# Patient Record
Sex: MALE | Race: OTHER | NOT HISPANIC OR LATINO | URBAN - METROPOLITAN AREA
[De-identification: names, ages, dates, MRNs, and addresses within clinical notes are randomized per-mention and may not be internally consistent; named-entity substitution may affect disease eponyms.]

---

## 2017-05-22 ENCOUNTER — EMERGENCY (EMERGENCY)
Facility: HOSPITAL | Age: 67
LOS: 1 days | Discharge: HOME | End: 2017-05-22
Attending: EMERGENCY MEDICINE | Admitting: EMERGENCY MEDICINE
Payer: COMMERCIAL

## 2017-05-22 VITALS
OXYGEN SATURATION: 100 % | TEMPERATURE: 98 F | HEIGHT: 65 IN | HEART RATE: 98 BPM | RESPIRATION RATE: 14 BRPM | SYSTOLIC BLOOD PRESSURE: 142 MMHG | DIASTOLIC BLOOD PRESSURE: 74 MMHG | WEIGHT: 154.98 LBS

## 2017-05-22 VITALS
OXYGEN SATURATION: 98 % | SYSTOLIC BLOOD PRESSURE: 114 MMHG | DIASTOLIC BLOOD PRESSURE: 77 MMHG | TEMPERATURE: 98 F | RESPIRATION RATE: 15 BRPM | HEART RATE: 94 BPM

## 2017-05-22 DIAGNOSIS — I10 ESSENTIAL (PRIMARY) HYPERTENSION: ICD-10-CM

## 2017-05-22 DIAGNOSIS — E11.9 TYPE 2 DIABETES MELLITUS WITHOUT COMPLICATIONS: ICD-10-CM

## 2017-05-22 DIAGNOSIS — R21 RASH AND OTHER NONSPECIFIC SKIN ERUPTION: ICD-10-CM

## 2017-05-22 DIAGNOSIS — L51.9 ERYTHEMA MULTIFORME, UNSPECIFIED: ICD-10-CM

## 2017-05-22 LAB
ALBUMIN SERPL ELPH-MCNC: 3.2 G/DL — LOW (ref 3.3–5)
ALP SERPL-CCNC: 71 U/L — SIGNIFICANT CHANGE UP (ref 40–120)
ALT FLD-CCNC: 22 U/L — SIGNIFICANT CHANGE UP (ref 12–78)
AMYLASE P1 CFR SERPL: 52 U/L — SIGNIFICANT CHANGE UP (ref 25–115)
ANION GAP SERPL CALC-SCNC: 12 MMOL/L — SIGNIFICANT CHANGE UP (ref 5–17)
APPEARANCE UR: CLEAR — SIGNIFICANT CHANGE UP
APTT BLD: 24.8 SEC — LOW (ref 27.5–37.4)
AST SERPL-CCNC: 20 U/L — SIGNIFICANT CHANGE UP (ref 15–37)
BILIRUB SERPL-MCNC: 0.4 MG/DL — SIGNIFICANT CHANGE UP (ref 0.2–1.2)
BILIRUB UR-MCNC: NEGATIVE — SIGNIFICANT CHANGE UP
BUN SERPL-MCNC: 20 MG/DL — SIGNIFICANT CHANGE UP (ref 7–23)
CALCIUM SERPL-MCNC: 8.6 MG/DL — SIGNIFICANT CHANGE UP (ref 8.5–10.1)
CHLORIDE SERPL-SCNC: 103 MMOL/L — SIGNIFICANT CHANGE UP (ref 96–108)
CO2 SERPL-SCNC: 22 MMOL/L — SIGNIFICANT CHANGE UP (ref 22–31)
COLOR SPEC: YELLOW — SIGNIFICANT CHANGE UP
CREAT SERPL-MCNC: 1.2 MG/DL — SIGNIFICANT CHANGE UP (ref 0.5–1.3)
DIFF PNL FLD: ABNORMAL
EOSINOPHIL NFR BLD AUTO: 4 % — SIGNIFICANT CHANGE UP (ref 0–6)
GLUCOSE SERPL-MCNC: 218 MG/DL — HIGH (ref 70–99)
GLUCOSE UR QL: 300 MG/DL
HCT VFR BLD CALC: 28.9 % — LOW (ref 39–50)
HGB BLD-MCNC: 9.5 G/DL — LOW (ref 13–17)
INR BLD: 1.13 RATIO — SIGNIFICANT CHANGE UP (ref 0.88–1.16)
KETONES UR-MCNC: NEGATIVE — SIGNIFICANT CHANGE UP
LEUKOCYTE ESTERASE UR-ACNC: NEGATIVE — SIGNIFICANT CHANGE UP
LIDOCAIN IGE QN: 172 U/L — SIGNIFICANT CHANGE UP (ref 73–393)
LYMPHOCYTES # BLD AUTO: 35 % — SIGNIFICANT CHANGE UP (ref 13–44)
MCHC RBC-ENTMCNC: 30.8 PG — SIGNIFICANT CHANGE UP (ref 27–34)
MCHC RBC-ENTMCNC: 33 GM/DL — SIGNIFICANT CHANGE UP (ref 32–36)
MCV RBC AUTO: 93.3 FL — SIGNIFICANT CHANGE UP (ref 80–100)
MONOCYTES NFR BLD AUTO: 6 % — SIGNIFICANT CHANGE UP (ref 1–9)
NEUTROPHILS NFR BLD AUTO: 54 % — SIGNIFICANT CHANGE UP (ref 43–77)
NITRITE UR-MCNC: NEGATIVE — SIGNIFICANT CHANGE UP
PH UR: 5 — SIGNIFICANT CHANGE UP (ref 5–8)
PLATELET # BLD AUTO: 109 K/UL — LOW (ref 150–400)
POTASSIUM SERPL-MCNC: 4.2 MMOL/L — SIGNIFICANT CHANGE UP (ref 3.5–5.3)
POTASSIUM SERPL-SCNC: 4.2 MMOL/L — SIGNIFICANT CHANGE UP (ref 3.5–5.3)
PROT SERPL-MCNC: 7.3 G/DL — SIGNIFICANT CHANGE UP (ref 6–8.3)
PROT UR-MCNC: 25 MG/DL
PROTHROM AB SERPL-ACNC: 12.4 SEC — SIGNIFICANT CHANGE UP (ref 9.8–12.7)
RBC # BLD: 3.1 M/UL — LOW (ref 4.2–5.8)
RBC # FLD: 13.6 % — SIGNIFICANT CHANGE UP (ref 10.3–14.5)
SODIUM SERPL-SCNC: 137 MMOL/L — SIGNIFICANT CHANGE UP (ref 135–145)
SP GR SPEC: 1.02 — SIGNIFICANT CHANGE UP (ref 1.01–1.02)
UROBILINOGEN FLD QL: NEGATIVE — SIGNIFICANT CHANGE UP
WBC # BLD: 3.8 K/UL — SIGNIFICANT CHANGE UP (ref 3.8–10.5)
WBC # FLD AUTO: 3.8 K/UL — SIGNIFICANT CHANGE UP (ref 3.8–10.5)

## 2017-05-22 PROCEDURE — 99284 EMERGENCY DEPT VISIT MOD MDM: CPT | Mod: 25

## 2017-05-22 PROCEDURE — 96375 TX/PRO/DX INJ NEW DRUG ADDON: CPT

## 2017-05-22 PROCEDURE — 99284 EMERGENCY DEPT VISIT MOD MDM: CPT

## 2017-05-22 PROCEDURE — 85027 COMPLETE CBC AUTOMATED: CPT

## 2017-05-22 PROCEDURE — 85730 THROMBOPLASTIN TIME PARTIAL: CPT

## 2017-05-22 PROCEDURE — 83690 ASSAY OF LIPASE: CPT

## 2017-05-22 PROCEDURE — 85610 PROTHROMBIN TIME: CPT

## 2017-05-22 PROCEDURE — 80053 COMPREHEN METABOLIC PANEL: CPT

## 2017-05-22 PROCEDURE — 87086 URINE CULTURE/COLONY COUNT: CPT

## 2017-05-22 PROCEDURE — 96374 THER/PROPH/DIAG INJ IV PUSH: CPT

## 2017-05-22 PROCEDURE — 82150 ASSAY OF AMYLASE: CPT

## 2017-05-22 PROCEDURE — 81001 URINALYSIS AUTO W/SCOPE: CPT

## 2017-05-22 RX ORDER — SODIUM CHLORIDE 9 MG/ML
3 INJECTION INTRAMUSCULAR; INTRAVENOUS; SUBCUTANEOUS ONCE
Qty: 0 | Refills: 0 | Status: COMPLETED | OUTPATIENT
Start: 2017-05-22 | End: 2017-05-22

## 2017-05-22 RX ORDER — SODIUM CHLORIDE 9 MG/ML
1000 INJECTION INTRAMUSCULAR; INTRAVENOUS; SUBCUTANEOUS ONCE
Qty: 0 | Refills: 0 | Status: COMPLETED | OUTPATIENT
Start: 2017-05-22 | End: 2017-05-22

## 2017-05-22 RX ORDER — FAMOTIDINE 10 MG/ML
20 INJECTION INTRAVENOUS ONCE
Qty: 0 | Refills: 0 | Status: COMPLETED | OUTPATIENT
Start: 2017-05-22 | End: 2017-05-22

## 2017-05-22 RX ADMIN — Medication 125 MILLIGRAM(S): at 13:42

## 2017-05-22 RX ADMIN — SODIUM CHLORIDE 1000 MILLILITER(S): 9 INJECTION INTRAMUSCULAR; INTRAVENOUS; SUBCUTANEOUS at 18:16

## 2017-05-22 RX ADMIN — SODIUM CHLORIDE 1000 MILLILITER(S): 9 INJECTION INTRAMUSCULAR; INTRAVENOUS; SUBCUTANEOUS at 13:44

## 2017-05-22 RX ADMIN — FAMOTIDINE 20 MILLIGRAM(S): 10 INJECTION INTRAVENOUS at 13:42

## 2017-05-22 RX ADMIN — SODIUM CHLORIDE 3 MILLILITER(S): 9 INJECTION INTRAMUSCULAR; INTRAVENOUS; SUBCUTANEOUS at 13:44

## 2017-05-22 NOTE — ED PROVIDER NOTE - PLAN OF CARE
Return to the ED for any new or worsening symptoms  Take your medication as prescribed  Continue the Prednisone   Follow up with your PMD in 2-3 days for a recheck   Follow up with Dermatology as an outpatient

## 2017-05-22 NOTE — ED PROVIDER NOTE - CHPI ED SYMPTOMS NEG
no fever/no bruising/no pain/no inflammation/no vomiting/no petechia/no scaly patches on skin/no chills/no decreased eating/drinking

## 2017-05-22 NOTE — ED PROVIDER NOTE - CARE PLAN
Principal Discharge DX:	Erythema multiforme  Instructions for follow-up, activity and diet:	Return to the ED for any new or worsening symptoms  Take your medication as prescribed  Continue the Prednisone   Follow up with your PMD in 2-3 days for a recheck   Follow up with Dermatology as an outpatient  Secondary Diagnosis:	Rash

## 2017-05-22 NOTE — ED PROVIDER NOTE - OBJECTIVE STATEMENT
Pt is a 66 yo male who presents to the ED with a cc of possible allergic reaction.  Pt reports that he was taking Clindamycin and recently stopped the prescription.  He had been on this medication for cough and nasal congestion with has since resolved. Yesterday morning he started to experience generalized itching worse in his lower ext and he noted that he developed a generalized rash.  he called his PMD and was started on Prednisone with no relief and so he came to the ED for further elv.  Denies fever, chills, N/V/D/C, CP, SOB, abd pain, ext numbness or weakness.  Pt denies tongue swelling or difficulty swallowing.  Denies prior previous reactions.

## 2017-05-22 NOTE — ED ADULT NURSE NOTE - OBJECTIVE STATEMENT
pt comes in with itchy rash, all over legs arms and body, back is excoriated. pt denies fever. pt was on clindamycin recently.

## 2017-05-23 LAB
CULTURE RESULTS: NO GROWTH — SIGNIFICANT CHANGE UP
SPECIMEN SOURCE: SIGNIFICANT CHANGE UP

## 2018-01-10 ENCOUNTER — EMERGENCY (EMERGENCY)
Facility: HOSPITAL | Age: 68
LOS: 1 days | Discharge: HOME | End: 2018-01-10
Attending: EMERGENCY MEDICINE
Payer: COMMERCIAL

## 2018-01-10 VITALS
RESPIRATION RATE: 20 BRPM | DIASTOLIC BLOOD PRESSURE: 92 MMHG | HEART RATE: 100 BPM | SYSTOLIC BLOOD PRESSURE: 178 MMHG | WEIGHT: 154.98 LBS | OXYGEN SATURATION: 98 % | TEMPERATURE: 99 F | HEIGHT: 65 IN

## 2018-01-10 PROCEDURE — 99283 EMERGENCY DEPT VISIT LOW MDM: CPT

## 2018-01-10 PROCEDURE — 99284 EMERGENCY DEPT VISIT MOD MDM: CPT

## 2018-01-10 RX ORDER — AZTREONAM 2 G
1 VIAL (EA) INJECTION
Qty: 14 | Refills: 0 | OUTPATIENT
Start: 2018-01-10 | End: 2018-01-16

## 2018-01-10 NOTE — ED PROVIDER NOTE - OBJECTIVE STATEMENT
68 y/o M pt w/ PMHx of DM, HLD, HTN presents to the ED c/o swelling under. Pt also c/o of 3 days of cough. Denies fever, chills, ear pain or any other complaints. NKDA.

## 2018-01-10 NOTE — ED ADULT TRIAGE NOTE - CHIEF COMPLAINT QUOTE
C/o swelling to left eyelid x 2 days but worsened since last night with pain on left side of face, unable to wear denture as hurts on left side

## 2019-08-13 NOTE — ED ADULT NURSE NOTE - CHIEF COMPLAINT QUOTE
S/P allergic reaction to antibiotic. Dosen"t remember the antibiotic. C/O generalized hives to body Detail Level: Generalized Detail Level: Zone Detail Level: Simple

## 2019-09-19 PROBLEM — B02.9 ZOSTER WITHOUT COMPLICATIONS: Chronic | Status: ACTIVE | Noted: 2018-01-10

## 2019-10-17 ENCOUNTER — APPOINTMENT (OUTPATIENT)
Dept: VASCULAR SURGERY | Facility: CLINIC | Age: 69
End: 2019-10-17
Payer: COMMERCIAL

## 2019-10-17 VITALS
BODY MASS INDEX: 26.33 KG/M2 | SYSTOLIC BLOOD PRESSURE: 155 MMHG | DIASTOLIC BLOOD PRESSURE: 77 MMHG | TEMPERATURE: 98 F | HEART RATE: 83 BPM | WEIGHT: 158 LBS | HEIGHT: 65 IN

## 2019-10-17 PROCEDURE — 93924 LWR XTR VASC STDY BILAT: CPT

## 2019-10-17 PROCEDURE — 99204 OFFICE O/P NEW MOD 45 MIN: CPT

## 2019-10-17 PROCEDURE — 93880 EXTRACRANIAL BILAT STUDY: CPT

## 2019-10-17 NOTE — ASSESSMENT
[FreeTextEntry1] : 68 yo male with history of dm, htn, hld, cad s/p pci, carotid stenosis presents for evaluation of lower extremity claudication\par carotid duplex shows 50-60% right ica stenosis and 60-79% left ica stenosis \par ashley/pvr shows mild  bilateral disease with 0.9 at rest and drops to 0.7 after exercise \par at this time would not recommend any surgical or endovascular intervention for his lower extremity claudications \par pt advised to increase exercise and continue with medical management  \par pt to follow up in 6 months with repeat duplex

## 2019-10-17 NOTE — PHYSICAL EXAM
[2+] : right 2+ [No Rash or Lesion] : No rash or lesion [Alert] : alert [Calm] : calm [JVD] : no jugular venous distention  [Normal Breath Sounds] : Normal breath sounds [0] : left 0 [Ankle Swelling (On Exam)] : not present [Varicose Veins Of Lower Extremities] : not present [Abdomen Tenderness] : ~T ~M No abdominal tenderness [] : not present [Skin Ulcer] : no ulcer [de-identified] : motor intact b/l lower extremities, muscle strength 5/5 with dorsi and plantar flexion\par  [de-identified] : appears well  [de-identified] : cn 2-12 intact, sensation intact b/l upper and lower extremities

## 2019-10-17 NOTE — CONSULT LETTER
[Dear  ___] : Dear  [unfilled], [Consult Letter:] : I had the pleasure of evaluating your patient, [unfilled]. [Please see my note below.] : Please see my note below. [Sincerely,] : Sincerely, [FreeTextEntry3] : Anthony Carey M.D., FJENNIFER., R.P.V.I.\par \par  [facility]\par  of  Surgery\par Assistant Professor of Radiology\par Vascular Surgery at [facility]\par

## 2019-10-17 NOTE — HISTORY OF PRESENT ILLNESS
[FreeTextEntry1] : 68 yo male with history of dm, htn, hld, cad s/p pci, carotid stenosis presents for evaluation.  pt denies any history of cva or stroke like symptoms.  pt also reports lower extremity claudications after walking about 3-4 blocks.  pt denies any history of rest pain or lower extremity ulcerations.  \par pt states that he was evaluated by cardiology and underwent an angiogram, no intervention was done due to "calcifications".  pt states symptoms are bilateral but slightly worse on the left lower extremity

## 2020-07-16 ENCOUNTER — APPOINTMENT (OUTPATIENT)
Dept: VASCULAR SURGERY | Facility: CLINIC | Age: 70
End: 2020-07-16
Payer: COMMERCIAL

## 2020-07-16 VITALS
BODY MASS INDEX: 26.82 KG/M2 | HEIGHT: 65 IN | HEART RATE: 80 BPM | DIASTOLIC BLOOD PRESSURE: 79 MMHG | WEIGHT: 161 LBS | SYSTOLIC BLOOD PRESSURE: 140 MMHG

## 2020-07-16 VITALS — TEMPERATURE: 98 F

## 2020-07-16 PROCEDURE — 99213 OFFICE O/P EST LOW 20 MIN: CPT

## 2020-07-16 PROCEDURE — 93880 EXTRACRANIAL BILAT STUDY: CPT

## 2020-07-16 NOTE — HISTORY OF PRESENT ILLNESS
[FreeTextEntry1] : 69 yo male with history of dm, htn, hld, cad s/p pci, carotid stenosis presents for evaluation.  pt denies any history of cva or stroke like symptoms.  pt also reports lower extremity claudications after walking about 3-4 blocks.  pt denies any history of rest pain or lower extremity ulcerations.  \par pt states that he was evaluated by cardiology and underwent an angiogram, no intervention was done due to "calcifications".  pt states symptoms are bilateral but slightly worse on the left lower extremity

## 2020-07-16 NOTE — PHYSICAL EXAM
[JVD] : no jugular venous distention  [Normal Breath Sounds] : Normal breath sounds [2+] : left 2+ [Ankle Swelling (On Exam)] : not present [0] : left 0 [Varicose Veins Of Lower Extremities] : not present [Abdomen Tenderness] : ~T ~M No abdominal tenderness [No Rash or Lesion] : No rash or lesion [] : not present [Skin Ulcer] : no ulcer [Alert] : alert [Calm] : calm [de-identified] : appears well  [de-identified] : motor intact b/l lower extremities, muscle strength 5/5 with dorsi and plantar flexion\par  [de-identified] : cn 2-12 intact, sensation intact b/l upper and lower extremities

## 2020-07-16 NOTE — ASSESSMENT
[FreeTextEntry1] : 71 yo male with history of dm, htn, hld, cad s/p pci, carotid stenosis \par carotid duplex shows 50-60% right ica stenosis and 60-79% left ica stenosis \par \par at this time would not recommend any surgical or endovascular intervention for his lower extremity claudications \par pt advised to increase exercise and continue with medical management  \par pt to follow up in 6 months with repeat duplex

## 2020-08-20 NOTE — ED PROVIDER NOTE - EYES, MLM
-- DO NOT REPLY / DO NOT REPLY ALL --  -- Message is from the [facility]--    COVID-19 Universal Screening: N/A - Not about scheduling    General Patient Message      Reason for Call: The nurse  wanted to know if its okay to use triple antibiotic ointment for the patient skin tear.     Caller Information       Type Contact Phone    08/20/2020 05:22 PM CDT Phone (Incoming) Enrike (Nurse) [phone]          Alternative phone number: n/a    Turnaround time given to caller:   \"This message will be sent to [state Provider's name]. The clinical team will fulfill your request as soon as they review your message when the office opens tomorrow.\"     Clear bilaterally, pupils equal, round and reactive to light.

## 2020-11-05 VITALS
OXYGEN SATURATION: 99 % | DIASTOLIC BLOOD PRESSURE: 59 MMHG | SYSTOLIC BLOOD PRESSURE: 124 MMHG | HEART RATE: 116 BPM | TEMPERATURE: 98 F | WEIGHT: 156.09 LBS | RESPIRATION RATE: 18 BRPM | HEIGHT: 65 IN

## 2020-11-06 ENCOUNTER — INPATIENT (INPATIENT)
Facility: HOSPITAL | Age: 70
LOS: 1 days | Discharge: HOME | DRG: 311 | End: 2020-11-08
Attending: INTERNAL MEDICINE | Admitting: INTERNAL MEDICINE
Payer: COMMERCIAL

## 2020-11-06 DIAGNOSIS — N18.9 CHRONIC KIDNEY DISEASE, UNSPECIFIED: ICD-10-CM

## 2020-11-06 DIAGNOSIS — I73.9 PERIPHERAL VASCULAR DISEASE, UNSPECIFIED: ICD-10-CM

## 2020-11-06 DIAGNOSIS — I24.8 OTHER FORMS OF ACUTE ISCHEMIC HEART DISEASE: ICD-10-CM

## 2020-11-06 DIAGNOSIS — D64.9 ANEMIA, UNSPECIFIED: ICD-10-CM

## 2020-11-06 DIAGNOSIS — E11.9 TYPE 2 DIABETES MELLITUS WITHOUT COMPLICATIONS: ICD-10-CM

## 2020-11-06 DIAGNOSIS — I10 ESSENTIAL (PRIMARY) HYPERTENSION: ICD-10-CM

## 2020-11-06 DIAGNOSIS — Z95.5 PRESENCE OF CORONARY ANGIOPLASTY IMPLANT AND GRAFT: Chronic | ICD-10-CM

## 2020-11-06 DIAGNOSIS — I25.10 ATHEROSCLEROTIC HEART DISEASE OF NATIVE CORONARY ARTERY WITHOUT ANGINA PECTORIS: ICD-10-CM

## 2020-11-06 LAB
ALBUMIN SERPL ELPH-MCNC: 3.7 G/DL — SIGNIFICANT CHANGE UP (ref 3.3–5)
ALBUMIN SERPL ELPH-MCNC: 3.8 G/DL — SIGNIFICANT CHANGE UP (ref 3.3–5)
ALP SERPL-CCNC: 61 U/L — SIGNIFICANT CHANGE UP (ref 40–120)
ALP SERPL-CCNC: 66 U/L — SIGNIFICANT CHANGE UP (ref 40–120)
ALT FLD-CCNC: 7 U/L — LOW (ref 10–45)
ALT FLD-CCNC: 8 U/L — LOW (ref 10–45)
ANION GAP SERPL CALC-SCNC: 14 MMOL/L — SIGNIFICANT CHANGE UP (ref 5–17)
ANION GAP SERPL CALC-SCNC: 14 MMOL/L — SIGNIFICANT CHANGE UP (ref 5–17)
ANISOCYTOSIS BLD QL: SIGNIFICANT CHANGE UP
ANISOCYTOSIS BLD QL: SLIGHT — SIGNIFICANT CHANGE UP
APPEARANCE UR: CLEAR — SIGNIFICANT CHANGE UP
APTT BLD: 21.7 SEC — LOW (ref 27.5–35.5)
AST SERPL-CCNC: 24 U/L — SIGNIFICANT CHANGE UP (ref 10–40)
AST SERPL-CCNC: 37 U/L — SIGNIFICANT CHANGE UP (ref 10–40)
BACTERIA # UR AUTO: PRESENT /HPF
BASO STIPL BLD QL SMEAR: PRESENT — SIGNIFICANT CHANGE UP
BASOPHILS # BLD AUTO: 0 K/UL — SIGNIFICANT CHANGE UP (ref 0–0.2)
BASOPHILS NFR BLD AUTO: 0 % — SIGNIFICANT CHANGE UP (ref 0–2)
BILIRUB SERPL-MCNC: 0.4 MG/DL — SIGNIFICANT CHANGE UP (ref 0.2–1.2)
BILIRUB SERPL-MCNC: 0.9 MG/DL — SIGNIFICANT CHANGE UP (ref 0.2–1.2)
BILIRUB UR-MCNC: NEGATIVE — SIGNIFICANT CHANGE UP
BLASTS # FLD: 10 % — HIGH (ref 0–0)
BLASTS # FLD: 13 % — HIGH
BLD GP AB SCN SERPL QL: NEGATIVE — SIGNIFICANT CHANGE UP
BUN SERPL-MCNC: 46 MG/DL — HIGH (ref 7–23)
BUN SERPL-MCNC: 50 MG/DL — HIGH (ref 7–23)
CALCIUM SERPL-MCNC: 8.5 MG/DL — SIGNIFICANT CHANGE UP (ref 8.4–10.5)
CALCIUM SERPL-MCNC: 8.8 MG/DL — SIGNIFICANT CHANGE UP (ref 8.4–10.5)
CHLORIDE SERPL-SCNC: 101 MMOL/L — SIGNIFICANT CHANGE UP (ref 96–108)
CHLORIDE SERPL-SCNC: 99 MMOL/L — SIGNIFICANT CHANGE UP (ref 96–108)
CHLORIDE UR-SCNC: 108 MMOL/L — SIGNIFICANT CHANGE UP
CK MB CFR SERPL CALC: 4.4 NG/ML — SIGNIFICANT CHANGE UP (ref 0–6.7)
CK MB CFR SERPL CALC: 4.5 NG/ML — SIGNIFICANT CHANGE UP (ref 0–6.7)
CK MB CFR SERPL CALC: 4.7 NG/ML — SIGNIFICANT CHANGE UP (ref 0–6.7)
CK MB CFR SERPL CALC: 5 NG/ML — SIGNIFICANT CHANGE UP (ref 0–6.7)
CK SERPL-CCNC: 75 U/L — SIGNIFICANT CHANGE UP (ref 30–200)
CK SERPL-CCNC: 78 U/L — SIGNIFICANT CHANGE UP (ref 30–200)
CK SERPL-CCNC: 84 U/L — SIGNIFICANT CHANGE UP (ref 30–200)
CK SERPL-CCNC: 91 U/L — SIGNIFICANT CHANGE UP (ref 30–200)
CO2 SERPL-SCNC: 16 MMOL/L — LOW (ref 22–31)
CO2 SERPL-SCNC: 17 MMOL/L — LOW (ref 22–31)
COLOR SPEC: YELLOW — SIGNIFICANT CHANGE UP
COMMENT - URINE: SIGNIFICANT CHANGE UP
CREAT SERPL-MCNC: 2.7 MG/DL — HIGH (ref 0.5–1.3)
CREAT SERPL-MCNC: 3.03 MG/DL — HIGH (ref 0.5–1.3)
DACRYOCYTES BLD QL SMEAR: SLIGHT — SIGNIFICANT CHANGE UP
DACRYOCYTES BLD QL SMEAR: SLIGHT — SIGNIFICANT CHANGE UP
DIFF PNL FLD: ABNORMAL
DOHLE BOD BLD QL SMEAR: SIGNIFICANT CHANGE UP
EOSINOPHIL # BLD AUTO: 0 K/UL — SIGNIFICANT CHANGE UP (ref 0–0.5)
EOSINOPHIL NFR BLD AUTO: 0 % — SIGNIFICANT CHANGE UP (ref 0–6)
EPI CELLS # UR: SIGNIFICANT CHANGE UP /HPF (ref 0–5)
FERRITIN SERPL-MCNC: 137 NG/ML — SIGNIFICANT CHANGE UP (ref 30–400)
GIANT PLATELETS BLD QL SMEAR: PRESENT — SIGNIFICANT CHANGE UP
GLUCOSE BLDC GLUCOMTR-MCNC: 295 MG/DL — HIGH (ref 70–99)
GLUCOSE BLDC GLUCOMTR-MCNC: 340 MG/DL — HIGH (ref 70–99)
GLUCOSE BLDC GLUCOMTR-MCNC: 346 MG/DL — HIGH (ref 70–99)
GLUCOSE BLDC GLUCOMTR-MCNC: 434 MG/DL — HIGH (ref 70–99)
GLUCOSE SERPL-MCNC: 286 MG/DL — HIGH (ref 70–99)
GLUCOSE SERPL-MCNC: 377 MG/DL — HIGH (ref 70–99)
GLUCOSE UR QL: 500
GRAN CASTS # UR COMP ASSIST: ABNORMAL /LPF
HAPTOGLOB SERPL-MCNC: 108 MG/DL — SIGNIFICANT CHANGE UP (ref 34–200)
HCT VFR BLD CALC: 16.1 % — CRITICAL LOW (ref 39–50)
HCT VFR BLD CALC: 23.1 % — LOW (ref 39–50)
HCT VFR BLD CALC: 24.9 % — LOW (ref 39–50)
HGB BLD-MCNC: 4.8 G/DL — CRITICAL LOW (ref 13–17)
HGB BLD-MCNC: 7.4 G/DL — LOW (ref 13–17)
HGB BLD-MCNC: 7.9 G/DL — LOW (ref 13–17)
HYPOCHROMIA BLD QL: SIGNIFICANT CHANGE UP
INR BLD: 1.36 — HIGH (ref 0.88–1.16)
IRON SATN MFR SERPL: 33 % — SIGNIFICANT CHANGE UP (ref 16–55)
IRON SATN MFR SERPL: 80 UG/DL — SIGNIFICANT CHANGE UP (ref 45–165)
KETONES UR-MCNC: NEGATIVE — SIGNIFICANT CHANGE UP
LDH SERPL L TO P-CCNC: 364 U/L — HIGH (ref 50–242)
LEUKOCYTE ESTERASE UR-ACNC: NEGATIVE — SIGNIFICANT CHANGE UP
LG PLATELETS BLD QL AUTO: PRESENT — SIGNIFICANT CHANGE UP
LIDOCAIN IGE QN: 51 U/L — SIGNIFICANT CHANGE UP (ref 7–60)
LYMPHOCYTES # BLD AUTO: 21 % — SIGNIFICANT CHANGE UP (ref 13–44)
LYMPHOCYTES # BLD AUTO: 39.6 % — SIGNIFICANT CHANGE UP (ref 13–44)
LYMPHOCYTES # BLD AUTO: 4.21 K/UL — HIGH (ref 1–3.3)
MACROCYTES BLD QL: SIGNIFICANT CHANGE UP
MAGNESIUM SERPL-MCNC: 1.8 MG/DL — SIGNIFICANT CHANGE UP (ref 1.6–2.6)
MAGNESIUM SERPL-MCNC: 1.9 MG/DL — SIGNIFICANT CHANGE UP (ref 1.6–2.6)
MAGNESIUM SERPL-MCNC: 1.9 MG/DL — SIGNIFICANT CHANGE UP (ref 1.6–2.6)
MANUAL SMEAR VERIFICATION: SIGNIFICANT CHANGE UP
MANUAL SMEAR VERIFICATION: SIGNIFICANT CHANGE UP
MCHC RBC-ENTMCNC: 29.7 PG — SIGNIFICANT CHANGE UP (ref 27–34)
MCHC RBC-ENTMCNC: 29.7 PG — SIGNIFICANT CHANGE UP (ref 27–34)
MCHC RBC-ENTMCNC: 29.8 GM/DL — LOW (ref 32–36)
MCHC RBC-ENTMCNC: 30 PG — SIGNIFICANT CHANGE UP (ref 27–34)
MCHC RBC-ENTMCNC: 31.7 GM/DL — LOW (ref 32–36)
MCHC RBC-ENTMCNC: 32 GM/DL — SIGNIFICANT CHANGE UP (ref 32–36)
MCV RBC AUTO: 100.6 FL — HIGH (ref 80–100)
MCV RBC AUTO: 92.8 FL — SIGNIFICANT CHANGE UP (ref 80–100)
MCV RBC AUTO: 93.6 FL — SIGNIFICANT CHANGE UP (ref 80–100)
METAMYELOCYTES # FLD: 0.9 % — HIGH (ref 0–0)
METAMYELOCYTES # FLD: 3 % — HIGH
MICROCYTES BLD QL: SLIGHT — SIGNIFICANT CHANGE UP
MONOCYTES # BLD AUTO: 1.72 K/UL — HIGH (ref 0–0.9)
MONOCYTES NFR BLD AUTO: 16 % — HIGH (ref 2–14)
MONOCYTES NFR BLD AUTO: 16.2 % — HIGH (ref 2–14)
MYELOCYTES NFR BLD: 2 % — HIGH
MYELOCYTES NFR BLD: 3.6 % — HIGH (ref 0–0)
NEUTROPHILS # BLD AUTO: 4.22 K/UL — SIGNIFICANT CHANGE UP (ref 1.8–7.4)
NEUTROPHILS NFR BLD AUTO: 39.7 % — LOW (ref 43–77)
NEUTROPHILS NFR BLD AUTO: 43 % — SIGNIFICANT CHANGE UP (ref 43–77)
NEUTS BAND # BLD: 2 % — SIGNIFICANT CHANGE UP
NITRITE UR-MCNC: NEGATIVE — SIGNIFICANT CHANGE UP
NRBC # BLD: 1 /100 — HIGH (ref 0–0)
NRBC # BLD: 2 /100 WBCS — HIGH (ref 0–0)
NRBC # BLD: 3 /100 WBCS — HIGH (ref 0–0)
NRBC # BLD: SIGNIFICANT CHANGE UP /100 WBCS (ref 0–0)
NT-PROBNP SERPL-SCNC: 6492 PG/ML — HIGH (ref 0–300)
OSMOLALITY UR: 360 MOSM/KG — SIGNIFICANT CHANGE UP (ref 300–900)
OVALOCYTES BLD QL SMEAR: SLIGHT — SIGNIFICANT CHANGE UP
OVALOCYTES BLD QL SMEAR: SLIGHT — SIGNIFICANT CHANGE UP
PH UR: 6 — SIGNIFICANT CHANGE UP (ref 5–8)
PLAT MORPH BLD: ABNORMAL
PLAT MORPH BLD: ABNORMAL
PLATELET # BLD AUTO: 125 K/UL — LOW (ref 150–400)
PLATELET # BLD AUTO: 141 K/UL — LOW (ref 150–400)
PLATELET # BLD AUTO: 145 K/UL — LOW (ref 150–400)
POIKILOCYTOSIS BLD QL AUTO: SLIGHT — SIGNIFICANT CHANGE UP
POLYCHROMASIA BLD QL SMEAR: SIGNIFICANT CHANGE UP
POLYCHROMASIA BLD QL SMEAR: SLIGHT — SIGNIFICANT CHANGE UP
POTASSIUM SERPL-MCNC: 4.7 MMOL/L — SIGNIFICANT CHANGE UP (ref 3.5–5.3)
POTASSIUM SERPL-MCNC: 5.6 MMOL/L — HIGH (ref 3.5–5.3)
POTASSIUM SERPL-SCNC: 4.7 MMOL/L — SIGNIFICANT CHANGE UP (ref 3.5–5.3)
POTASSIUM SERPL-SCNC: 5.6 MMOL/L — HIGH (ref 3.5–5.3)
POTASSIUM UR-SCNC: 16 MMOL/L — SIGNIFICANT CHANGE UP
PROT ?TM UR-MCNC: 18 MG/DL — HIGH (ref 0–12)
PROT SERPL-MCNC: 7.3 G/DL — SIGNIFICANT CHANGE UP (ref 6–8.3)
PROT SERPL-MCNC: 7.3 G/DL — SIGNIFICANT CHANGE UP (ref 6–8.3)
PROT UR-MCNC: 30 MG/DL
PROTHROM AB SERPL-ACNC: 16.1 SEC — HIGH (ref 10.6–13.6)
RBC # BLD: 1.6 M/UL — LOW (ref 4.2–5.8)
RBC # BLD: 2.49 M/UL — LOW (ref 4.2–5.8)
RBC # BLD: 2.64 M/UL — LOW (ref 4.2–5.8)
RBC # BLD: 2.66 M/UL — LOW (ref 4.2–5.8)
RBC # FLD: 18.4 % — HIGH (ref 10.3–14.5)
RBC # FLD: 18.6 % — HIGH (ref 10.3–14.5)
RBC # FLD: 18.9 % — HIGH (ref 10.3–14.5)
RBC BLD AUTO: ABNORMAL
RBC BLD AUTO: ABNORMAL
RBC CASTS # UR COMP ASSIST: < 5 /HPF — SIGNIFICANT CHANGE UP
RETICS #: 206.7 K/UL — HIGH (ref 25–125)
RETICS/RBC NFR: 7.8 % — HIGH (ref 0.5–2.5)
RH IG SCN BLD-IMP: POSITIVE — SIGNIFICANT CHANGE UP
RH IG SCN BLD-IMP: POSITIVE — SIGNIFICANT CHANGE UP
SARS-COV-2 RNA SPEC QL NAA+PROBE: SIGNIFICANT CHANGE UP
SMUDGE CELLS # BLD: PRESENT — SIGNIFICANT CHANGE UP
SODIUM SERPL-SCNC: 129 MMOL/L — LOW (ref 135–145)
SODIUM SERPL-SCNC: 132 MMOL/L — LOW (ref 135–145)
SODIUM UR-SCNC: 110 MMOL/L — SIGNIFICANT CHANGE UP
SP GR SPEC: 1.02 — SIGNIFICANT CHANGE UP (ref 1–1.03)
SPHEROCYTES BLD QL SMEAR: SLIGHT — SIGNIFICANT CHANGE UP
STOMATOCYTES BLD QL SMEAR: SLIGHT — SIGNIFICANT CHANGE UP
TIBC SERPL-MCNC: 242 UG/DL — SIGNIFICANT CHANGE UP (ref 220–430)
TROPONIN T SERPL-MCNC: 0.16 NG/ML — CRITICAL HIGH (ref 0–0.01)
TROPONIN T SERPL-MCNC: 0.24 NG/ML — CRITICAL HIGH (ref 0–0.01)
TROPONIN T SERPL-MCNC: 0.26 NG/ML — CRITICAL HIGH (ref 0–0.01)
TROPONIN T SERPL-MCNC: 0.28 NG/ML — CRITICAL HIGH (ref 0–0.01)
TROPONIN T SERPL-MCNC: 0.37 NG/ML — CRITICAL HIGH (ref 0–0.01)
UIBC SERPL-MCNC: 162 UG/DL — SIGNIFICANT CHANGE UP (ref 110–370)
UROBILINOGEN FLD QL: 0.2 E.U./DL — SIGNIFICANT CHANGE UP
UUN UR-MCNC: 258 MG/DL — SIGNIFICANT CHANGE UP
WBC # BLD: 10.63 K/UL — HIGH (ref 3.8–10.5)
WBC # BLD: 12.91 K/UL — HIGH (ref 3.8–10.5)
WBC # BLD: 8.51 K/UL — SIGNIFICANT CHANGE UP (ref 3.8–10.5)
WBC # FLD AUTO: 10.63 K/UL — HIGH (ref 3.8–10.5)
WBC # FLD AUTO: 12.91 K/UL — HIGH (ref 3.8–10.5)
WBC # FLD AUTO: 8.51 K/UL — SIGNIFICANT CHANGE UP (ref 3.8–10.5)
WBC UR QL: < 5 /HPF — SIGNIFICANT CHANGE UP

## 2020-11-06 PROCEDURE — 93308 TTE F-UP OR LMTD: CPT | Mod: 26

## 2020-11-06 PROCEDURE — 99291 CRITICAL CARE FIRST HOUR: CPT

## 2020-11-06 PROCEDURE — 93010 ELECTROCARDIOGRAM REPORT: CPT | Mod: 59

## 2020-11-06 PROCEDURE — 71045 X-RAY EXAM CHEST 1 VIEW: CPT | Mod: 26

## 2020-11-06 PROCEDURE — 99223 1ST HOSP IP/OBS HIGH 75: CPT

## 2020-11-06 RX ORDER — HEPARIN SODIUM 5000 [USP'U]/ML
5000 INJECTION INTRAVENOUS; SUBCUTANEOUS ONCE
Refills: 0 | Status: COMPLETED | OUTPATIENT
Start: 2020-11-06 | End: 2020-11-06

## 2020-11-06 RX ORDER — ASPIRIN/CALCIUM CARB/MAGNESIUM 324 MG
325 TABLET ORAL ONCE
Refills: 0 | Status: DISCONTINUED | OUTPATIENT
Start: 2020-11-06 | End: 2020-11-06

## 2020-11-06 RX ORDER — INSULIN LISPRO 100/ML
VIAL (ML) SUBCUTANEOUS
Refills: 0 | Status: DISCONTINUED | OUTPATIENT
Start: 2020-11-06 | End: 2020-11-08

## 2020-11-06 RX ORDER — ROSUVASTATIN CALCIUM 5 MG/1
1 TABLET ORAL
Qty: 0 | Refills: 0 | DISCHARGE

## 2020-11-06 RX ORDER — INSULIN LISPRO 100/ML
VIAL (ML) SUBCUTANEOUS
Refills: 0 | Status: DISCONTINUED | OUTPATIENT
Start: 2020-11-06 | End: 2020-11-06

## 2020-11-06 RX ORDER — FUROSEMIDE 40 MG
20 TABLET ORAL ONCE
Refills: 0 | Status: COMPLETED | OUTPATIENT
Start: 2020-11-06 | End: 2020-11-06

## 2020-11-06 RX ORDER — ROSUVASTATIN CALCIUM 5 MG/1
10 TABLET ORAL AT BEDTIME
Refills: 0 | Status: DISCONTINUED | OUTPATIENT
Start: 2020-11-06 | End: 2020-11-08

## 2020-11-06 RX ORDER — ERGOCALCIFEROL 1.25 MG/1
1 CAPSULE ORAL
Qty: 0 | Refills: 0 | DISCHARGE

## 2020-11-06 RX ORDER — GABAPENTIN 400 MG/1
200 CAPSULE ORAL
Refills: 0 | Status: DISCONTINUED | OUTPATIENT
Start: 2020-11-06 | End: 2020-11-08

## 2020-11-06 RX ORDER — DEXTROSE 50 % IN WATER 50 %
25 SYRINGE (ML) INTRAVENOUS ONCE
Refills: 0 | Status: DISCONTINUED | OUTPATIENT
Start: 2020-11-06 | End: 2020-11-08

## 2020-11-06 RX ORDER — ASPIRIN/CALCIUM CARB/MAGNESIUM 324 MG
243 TABLET ORAL ONCE
Refills: 0 | Status: DISCONTINUED | OUTPATIENT
Start: 2020-11-06 | End: 2020-11-06

## 2020-11-06 RX ORDER — CALCITRIOL 0.5 UG/1
1 CAPSULE ORAL
Qty: 0 | Refills: 0 | DISCHARGE

## 2020-11-06 RX ORDER — DEXTROSE 50 % IN WATER 50 %
15 SYRINGE (ML) INTRAVENOUS ONCE
Refills: 0 | Status: DISCONTINUED | OUTPATIENT
Start: 2020-11-06 | End: 2020-11-08

## 2020-11-06 RX ORDER — INFLUENZA VIRUS VACCINE 15; 15; 15; 15 UG/.5ML; UG/.5ML; UG/.5ML; UG/.5ML
0.7 SUSPENSION INTRAMUSCULAR ONCE
Refills: 0 | Status: DISCONTINUED | OUTPATIENT
Start: 2020-11-06 | End: 2020-11-08

## 2020-11-06 RX ORDER — DEXTROSE 50 % IN WATER 50 %
12.5 SYRINGE (ML) INTRAVENOUS ONCE
Refills: 0 | Status: DISCONTINUED | OUTPATIENT
Start: 2020-11-06 | End: 2020-11-08

## 2020-11-06 RX ORDER — TICAGRELOR 90 MG/1
180 TABLET ORAL ONCE
Refills: 0 | Status: COMPLETED | OUTPATIENT
Start: 2020-11-06 | End: 2020-11-06

## 2020-11-06 RX ORDER — GLUCAGON INJECTION, SOLUTION 0.5 MG/.1ML
1 INJECTION, SOLUTION SUBCUTANEOUS ONCE
Refills: 0 | Status: DISCONTINUED | OUTPATIENT
Start: 2020-11-06 | End: 2020-11-08

## 2020-11-06 RX ORDER — METOPROLOL TARTRATE 50 MG
25 TABLET ORAL DAILY
Refills: 0 | Status: DISCONTINUED | OUTPATIENT
Start: 2020-11-06 | End: 2020-11-08

## 2020-11-06 RX ORDER — INFLUENZA VIRUS VACCINE 15; 15; 15; 15 UG/.5ML; UG/.5ML; UG/.5ML; UG/.5ML
0.5 SUSPENSION INTRAMUSCULAR ONCE
Refills: 0 | Status: DISCONTINUED | OUTPATIENT
Start: 2020-11-06 | End: 2020-11-06

## 2020-11-06 RX ORDER — SODIUM CHLORIDE 9 MG/ML
1000 INJECTION, SOLUTION INTRAVENOUS
Refills: 0 | Status: DISCONTINUED | OUTPATIENT
Start: 2020-11-06 | End: 2020-11-08

## 2020-11-06 RX ORDER — CALCITRIOL 0.5 UG/1
0.25 CAPSULE ORAL DAILY
Refills: 0 | Status: DISCONTINUED | OUTPATIENT
Start: 2020-11-06 | End: 2020-11-08

## 2020-11-06 RX ORDER — ERYTHROPOIETIN 10000 [IU]/ML
1 INJECTION, SOLUTION INTRAVENOUS; SUBCUTANEOUS
Qty: 0 | Refills: 0 | DISCHARGE

## 2020-11-06 RX ORDER — METOPROLOL TARTRATE 50 MG
1 TABLET ORAL
Qty: 0 | Refills: 0 | DISCHARGE

## 2020-11-06 RX ORDER — GABAPENTIN 400 MG/1
2 CAPSULE ORAL
Qty: 0 | Refills: 0 | DISCHARGE

## 2020-11-06 RX ADMIN — TICAGRELOR 180 MILLIGRAM(S): 90 TABLET ORAL at 01:07

## 2020-11-06 RX ADMIN — Medication 6: at 16:55

## 2020-11-06 RX ADMIN — Medication 25 MILLIGRAM(S): at 06:03

## 2020-11-06 RX ADMIN — GABAPENTIN 200 MILLIGRAM(S): 400 CAPSULE ORAL at 16:55

## 2020-11-06 RX ADMIN — CALCITRIOL 0.25 MICROGRAM(S): 0.5 CAPSULE ORAL at 12:04

## 2020-11-06 RX ADMIN — ROSUVASTATIN CALCIUM 10 MILLIGRAM(S): 5 TABLET ORAL at 21:35

## 2020-11-06 RX ADMIN — Medication 3: at 12:03

## 2020-11-06 RX ADMIN — GABAPENTIN 200 MILLIGRAM(S): 400 CAPSULE ORAL at 06:03

## 2020-11-06 RX ADMIN — HEPARIN SODIUM 5000 UNIT(S): 5000 INJECTION INTRAVENOUS; SUBCUTANEOUS at 01:07

## 2020-11-06 RX ADMIN — Medication 20 MILLIGRAM(S): at 09:25

## 2020-11-06 RX ADMIN — Medication 8: at 21:35

## 2020-11-06 RX ADMIN — Medication 4: at 07:04

## 2020-11-06 NOTE — H&P ADULT - NSCORESITESY/N_GEN_A_CORE_RD
No HPI:  72 y/o M with PMHx of brown's esophagus, HTN, DM, HLD, and hypothyroidism came in due to weakness. Patient states that he always had difficulty swallowing solids but since 1 week it has been getting worse. He has not been eating or drinking much since then and therefore feels extremely weak. Also states having cough productive of orange sputum since 2-3 days. No fever. No sick contacts. No SOB. Today, he went to his PMD for regular check up and was sent to ER because he "looked sick". He states feeling better now but is still very thirsty. Denies any other complaints including abdominal pain, nausea, vomiting, dysuria, hematuria, or changes in urine output.    SH: lives with this wife. Non smoker. Non alcoholic. (29 Apr 2019 16:00)  Patient stated having Anxiety disorder and was taking Xanax as needed and Zyprexa at night.  Denied h/o inpatient psych Tx.c/o periodic anxiety,poor night sleep,constant worry for many years.  Denied h/o psychosis or eyal.Denied h/o suicidal attempt.

## 2020-11-06 NOTE — ED PROVIDER NOTE - CARE PLAN
Principal Discharge DX:	ST elevation myocardial infarction (STEMI), unspecified artery   Principal Discharge DX:	Anemia, unspecified type  Secondary Diagnosis:	ST elevation myocardial infarction (STEMI), unspecified artery

## 2020-11-06 NOTE — H&P ADULT - PROBLEM SELECTOR PLAN 7
oral hypoglycemics on hold, will continue FS's and ICS PRN.    VTE PPx: SCDs  PT consulted: F/U recs

## 2020-11-06 NOTE — PROGRESS NOTE ADULT - PROBLEM SELECTOR PLAN 3
unsuccessful peripheral angioplasty in the past as per patient, on Xarelto/Statin at home.  --HOLDING anticoagulation. hx of PCI ~2012 @[facility] per patient, previously on Plavix/ASA. Now on ASA/Xarelto at home.  --patient reports his cardiologist Dr. Dr. Uzma Perry had performed EKG/Echo (both reportedly normal) in past week and cleared him for EGD/Colonoscopy which was performed on 10/29/20.  - will HOLD anticoagulation in setting of acute anemia, continue BB/Statin.

## 2020-11-06 NOTE — ED ADULT NURSE NOTE - OBJECTIVE STATEMENT
pt. presents with c/o dizziness, weakness and fatigue for 3 days. hx of 5 stents, pt. denies any chest pain, difficulty breathing, n/v/d, fever, cough. ekg showed st elevation, on-call team notified. pt. presents with c/o chest pain, dizziness, weakness and fatigue for 3 days. hx of 5 stents, pt. denies any chest pain, difficulty breathing, n/v/d, fever, cough. abnormal ekg, on-call team notified.

## 2020-11-06 NOTE — CONSULT NOTE ADULT - ATTENDING COMMENTS
Patient discussed with fellow in detail  Agree with assessment and plan   Severe anemia of unclear etiology  Will await results from laboratory studies as recommended above  Please transfuse for Hb <7  Will follow     Sarah Plaza MD

## 2020-11-06 NOTE — ED ADULT NURSE NOTE - NSIMPLEMENTINTERV_GEN_ALL_ED
Implemented All Universal Safety Interventions:  [place] to call system. Call bell, personal items and telephone within reach. Instruct patient to call for assistance. Room bathroom lighting operational. Non-slip footwear when patient is off stretcher. Physically safe environment: no spills, clutter or unnecessary equipment. Stretcher in lowest position, wheels locked, appropriate side rails in place.

## 2020-11-06 NOTE — PROGRESS NOTE ADULT - PROBLEM SELECTOR PLAN 6
BUN/Cr 50/3.03 upon admission, Cr noted to be 1.98 by outpatient labs 9/2020.  --will continue to avoid nephrotoxic agents.  --F/U UA and urine electrolytes. BUN/Cr 50/3.03 upon admission, Crt baseline noted to be 1.98 by outpatient labs 9/2020.  - recently underwent retacrit injection w/ his nephrologist Dr. Hernandez.  Office called to obtain most recent Crt level, awaiting call back.   - Continue to monitor renal function.   - f/u UA and urine electrolytes.   - will continue to avoid nephrotoxic agents.

## 2020-11-06 NOTE — PROGRESS NOTE ADULT - ASSESSMENT
70 yr old M with PMHx of HTN, hyperlipidemia, DM, Stage IV CKD, anemia of chronic disease, PVD, Carotid stenosis, CAD (prior PCIs in ~2012) who presents to [facility] ED 11/5/20 c/o progressively worsening fatigue and intermittent chest pain x few days, EKG revealed Sinus tachycardia@111BPM with ST elevation in aVR, V1, ST depressions I, II, III, AVF, V2-6. Given concern for STEMI, Cathlab team activated. Patient immediately loaded with Brilinta 180mg PO x 1 dose and Heparin 5000units IV x 1 dose. Labs returned revealing: WBC 10.63, H/H 4.8/16.1, Platelets 141, BUN/Cr 50/3.03, Troponin T 0.16, BNP 6492.  Upon review of EKG/Labs by interventional fellow, EKG changes likely demand ischemia from severe anemia rather than acute ACS.  Patient admitted to cardiac tele for suspected demand ischemia secondary to acute anemia.   Of note patient underwent pre op clearance by his cardiologist Dr. Uzma Perry per patient had EKG/Echo and was cleared for EGD/Colonoscopy.     70 yr old M with PMHx of HTN, hyperlipidemia, DM, Stage IV CKD, anemia of chronic disease, PVD, Carotid stenosis, CAD (prior PCIs in ~2012) who presents to [facility] ED 11/5/20 c/o progressively worsening fatigue and intermittent chest pain x few days, EKG revealed Sinus tachycardia@111BPM with ST elevation in aVR, V1, ST depressions I, II, III, AVF, V2-6. Given concern for STEMI, Cathlab team activated. Patient immediately loaded with Brilinta 180mg PO x 1 dose and Heparin 5000units IV x 1 dose. Labs returned revealing: WBC 10.63, H/H 4.8/16.1, Platelets 141, BUN/Cr 50/3.03, Troponin T 0.16, BNP 6492.  Upon review of EKG/Labs by interventional fellow, EKG changes likely demand ischemia from severe anemia rather than acute ACS.  Patient admitted to cardiac tele for suspected demand ischemia secondary to acute anemia.   Of note patient underwent pre op clearance by his cardiologist Dr. Uzma Perry per patient had EKG/Echo and was cleared for EGD/Colonoscopy.  HE is s/p EGD/colonoscopy with Dr. Wolf Marlow on 10/29/20 (reportedly normal per patient).  He followed up with his nephrologist Dr. Hernandez three days ago and received a retacrit injection.

## 2020-11-06 NOTE — H&P ADULT - ASSESSMENT
70 yr old M with PMHx of HTN, hyperlipidemia, DM, Stage III CKD, anemia of chronic disease, PVD, CAD with prior PCIs in ~2012 who presents to [facility] ED 11/5/20 c/o progressively worsening fatigue and intermittent chest pain x few days 70 yr old M with PMHx of HTN, hyperlipidemia, DM, Stage IV CKD, anemia of chronic disease, PVD, CAD (prior PCIs in ~2012) who presents to [facility] ED 11/5/20 c/o progressively worsening fatigue and intermittent chest pain x few days, EKG revealed Sinus tachycardia@111BPM with ST elevation in aVR, V1, ST depressions I, II, III, AVF, V2-6. Given concern for STEMI, Cathlab team activated. Patient immediately loaded with Brilinta 180mg PO x 1 dose and Heparin 5000units IV x 1 dose. Labs returned revealing: WBC 10.63, H/H 4.8/16.1, Platelets 141, Na 129, K 5.6, BUN/Cr 50/3.03, Glucose 377, Troponin T 0.16, BNP 6492.  Upon review of EKG/Labs by interventional fellow, EKG changes likely demand ischemia from severe anemia rather than acute ACS. Patient type and screened and ordered for transfusion with 2 units pRBC.  Patient now admitted to [facility] cardiac telemetry, for management of suspected demand ischemia secondary to acute anemia.

## 2020-11-06 NOTE — H&P ADULT - PROBLEM SELECTOR PLAN 3
unsuccessful peripheral angioplasty in the past as per patient, on Xarelto/Statin at home.  --HOLDING anticoagulation.

## 2020-11-06 NOTE — CONSULT NOTE ADULT - ASSESSMENT
70 yr old M with PMHx of HTN, hyperlipidemia, DM, Stage IV CKD (baseline Cr 1.98), PVD, CAD (prior PCIs in ~2012), AOCD c/o progressively worsening fatigue and intermittent chest pain x few days. Baseline Hb 7.7 last month, endoscopy/colonoscopy performed 10/29/20 by GI (Dr Hancock) were reportedly unremarkable. Found to have Hb 4.8. ST changes and elevated troponin attributed to demand ischemia. After 2units pRBC Hb improved to 7.9. GI consult called for work up of anemia    # Anemia  in the setting of CKD, AOCD  No evidence of overt GI bleeding  Recent EGD and colonoscopy as outpatient unrevealing  Hb improved to baseline after 2 units PRBC, also received Epo last week  - Video capsule endoscopy as outpatient to evaluate small bowel  (this could be done at pt's own GI or can follow up with out GI clinic at [address] [place] [address] fellows clinic on Tuesday am (Dr. Thorne)  - Monitor CBC to ensure no further drop  - will sign off. call back PRN    Recommendations discussed with primary team  Plan discussed with GI service attending    Brandon Patel MD  PGY-4 GI fellow  Pager: [phone]

## 2020-11-06 NOTE — PROGRESS NOTE ADULT - PROBLEM SELECTOR PLAN 5
H/H 4.8/16.1 upon admission, Hgb 8.4 (by labs 8/2020 per outpatient renal progress note).  --received Retacrit on 10/28/20 by nephrologist Dr. Hernandez.  --consented for blood transfusion and ordered for 2 units pRBCs in ED. Will F/U repeat CBC after 2nd unit of pRBCs, goal Hgb >7.0.  --EGD/Colonoscopy performed 10/29/20 with Dr. Wolf Marlow, normal as per patient. Will obtain official results in AM. stable, continue Toprol XL 25mg PO daily

## 2020-11-06 NOTE — H&P ADULT - PROBLEM SELECTOR PLAN 2
hx of PCI ~2012 @[facility] per patient, previously on Plavix/ASA. Now on ASA/Xarelto at home.  --patient reports his cardiologist Dr. Dr. Uzma Perry had performed EKG/Echo (both reportedly normal) in past week and cleared him for EGD/Colonoscopy which was performed on 10/29/20.  --will HOLD anticoagulation in setting of acute anemia, continue BB/Statin.

## 2020-11-06 NOTE — H&P ADULT - PROBLEM SELECTOR PLAN 5
H/H 4.8/16.1 upon admission, Hgb 8.4 (by labs 8/2020 per outpatient renal progress note).  --received Retacrit on 10/28/20 by nephrologist Dr. Hernandez.  --consented for blood transfusion and ordered for 2 units pRBCs in ED. Will F/U repeat CBC after 2nd unit of pRBCs, goal Hgb >7.0.  --EGD/Colonoscopy performed 10/29/20 with Dr. Wolf Marlow, normal as per patient. Will obtain official results in AM.

## 2020-11-06 NOTE — H&P ADULT - PROBLEM SELECTOR PLAN 1
currently chest pain free, Initial EKG revealed Sinus tachycardia@111BPM with ST elevation in aVR, V1, ST depressions I, II, III, AVF, V2-6.   --Given concern for STEMI, Cathlab team activated. Patient immediately loaded with Brilinta 180mg PO x 1 dose and Heparin 5000units IV x 1 dose.  --Labs returned:  Troponin T 0.16,  H/H 4.8/16.1.  --Upon review of EKG/Labs by interventional fellow, EKG changes likely demand ischemia from severe anemia rather than acute ACS. currently chest pain free, Initial EKG revealed Sinus tachycardia@111BPM with ST elevation in aVR, V1, ST depressions I, II, III, AVF, V2-6.   --Given concern for STEMI, Cathlab team activated. Patient immediately loaded with Brilinta 180mg PO x 1 dose and Heparin 5000units IV x 1 dose.  --Labs returned:  Troponin T 0.16,  H/H 4.8/16.1.  --Upon review of EKG/Labs by interventional fellow, EKG changes likely demand ischemia from severe anemia rather than acute ACS.  --Obtain serial EKGs and Echo in AM

## 2020-11-06 NOTE — ED PROVIDER NOTE - PROGRESS NOTE DETAILS
pending cath lab team arrival. cardiology fellow at bedside H/H 4.8/16.1 - pt consented for blood transfusion.  interventional fellow at bedside - cath code canceled. feel that likely ekg changes d/t demand ischemia from severe anemia rather than acute acs.  recommend medicine admission.  pt again denies any bleeding or dark stool, recently had negative scopes. likely d/t CKD. H/H 4.8/16.1 - pt consented for blood transfusion.  interventional fellow at bedside - cath code canceled. feel that likely ekg changes d/t demand ischemia from severe anemia rather than acute acs.  pt again denies any bleeding or dark stool, recently had negative scopes. likely d/t CKD.  interventional fellow recommend admission to [facility] tele

## 2020-11-06 NOTE — ED PROVIDER NOTE - PMH
CAD (coronary artery disease)    CKD (chronic kidney disease)    DM (diabetes mellitus)    HTN (hypertension)    PVD (peripheral vascular disease)

## 2020-11-06 NOTE — H&P ADULT - NSICDXPASTMEDICALHX_GEN_ALL_CORE_FT
PAST MEDICAL HISTORY:  CAD (coronary artery disease)     CKD (chronic kidney disease)     DM (diabetes mellitus)     HTN (hypertension)     PVD (peripheral vascular disease)

## 2020-11-06 NOTE — CONSULT NOTE ADULT - ASSESSMENT
70 yr old M with PMHx of HTN, hyperlipidemia, DM, Stage IV CKD (baseline Cr 1.98 from outpatient labs 9/2020), anemia of chronic disease, PVD, CAD (prior PCIs in ~2012) who presents to [facility] ED 11/5/20 c/o progressively worsening fatigue and intermittent chest pain x few days. Patient reports he received Retacrit injection 3 days ago by his nephrologist Dr. Hernandez. Patient’s Hgb was reportedly 7.7 last month, endoscopy/colonoscopy performed 10/29/20 were reportedly unremarkable.    #) DIAGNOSIS   - Severe macrocytic anemia, symptomatic [Baseline Hb 10 as of 2017]  - Grade 1 thrombocytopenia   - 2 of 4 CRAB     #) PLAN  - s/p 2 U PRBC (11/06)  - Send SPEP, UPEP, Immunofixation, Immunoglobulin panel, FLC  - Send Iron studies, B12, Folate, reticulocyte, haptoglobin, LDH  - Will review peripheral blood smear    - To discuss with Dr. Plaza    70 yr old M, Thai, with PMHx of HTN, hyperlipidemia, DM, Stage IV CKD (baseline Cr 1.98 from outpatient labs 9/2020), anemia of chronic disease, PVD, CAD (prior PCIs in ~2012) who presents to [facility] ED 11/5/20 c/o progressively worsening fatigue and intermittent chest pain x few days. Patient reports he received Retacrit injection 3 days ago by his nephrologist Dr. Hernandez. Patient’s Hgb was reportedly 7.7 last month, endoscopy/colonoscopy performed 10/29/20 were reportedly unremarkable.    #) DIAGNOSIS   - Severe macrocytic anemia, symptomatic [Baseline Hb 10 as of 2017 and 7.7 as of 10/2020]  - Grade 1 thrombocytopenia   - 2 of 4 CRAB     #) PLAN  - s/p 2 U PRBC (11/06)  - PBS shows occasional smudge cells, no schistocytes.   - Send SPEP, UPEP, Immunofixation, Immunoglobulin panel, FLC  - Send Iron studies, B12, Folate, reticulocyte, haptoglobin, LDH, flow cytometry   - Maintain active T+S, transfuse if Hb < 7, Platelet < 30K given active cardiac disease   - Recommend bone marrow biopsy as outpatient for further diagnostic evaluation     - To discuss with Dr. Plaza    70 yr old M, Divehi, with PMHx of HTN, hyperlipidemia, DM, Stage IV CKD (baseline Cr 1.98 from outpatient labs 9/2020), anemia of chronic disease, PVD, CAD (prior PCIs in ~2012) who presents to [facility] ED 11/5/20 c/o progressively worsening fatigue and intermittent chest pain x few days. Patient reports he received Retacrit injection 3 days ago by his nephrologist Dr. Hernandez. Patient’s Hgb was reportedly 7.7 last month, endoscopy/colonoscopy performed 10/29/20 were reportedly unremarkable.    #) DIAGNOSIS   - Severe macrocytic anemia, symptomatic [Baseline Hb 10 as of 2017 and 7.7 as of 10/2020]  - Grade 1 thrombocytopenia   - 2 of 4 CRAB, will rule-out monoclonal gammopathy    - Hx of retinal hemorrhage 2/2 ?pentoxylline   - Hx of left leg blood clot on Xarelto, unclear hypercoagulability      #) PLAN  - s/p 2 U PRBC (11/06)  - PBS shows occasional smudge cells, no schistocytes.   - Send SPEP, UPEP, Immunofixation, Immunoglobulin panel, FLC  - Send Iron studies, B12, Folate, reticulocyte, haptoglobin, LDH, flow cytometry   - Maintain active T+S, transfuse if Hb < 7, Platelet < 10K or < 20K if actively bleeding   - Recommend bone marrow biopsy as outpatient for further diagnostic evaluation   - Primary team to obtain collaterals from [facility] regarding medical history of blood clots and indication for Xarelto.     - To discuss with Dr. Plaza    70 yr old M, Romansh, with PMHx of HTN, hyperlipidemia, DM, Stage IV CKD (baseline Cr 1.98 from outpatient labs 9/2020), anemia of chronic disease, PVD, CAD (prior PCIs in ~2012) who presents to [facility] ED 11/5/20 c/o progressively worsening fatigue and intermittent chest pain x few days. Patient reports he received Retacrit injection 3 days ago by his nephrologist Dr. Hernandez. Patient’s Hgb was reportedly 7.7 last month, endoscopy/colonoscopy performed 10/29/20 were reportedly unremarkable.    #) DIAGNOSIS   - Severe macrocytic anemia, symptomatic [Baseline Hb 10 as of 2017 and 7.7 as of 10/2020]  - Grade 1 thrombocytopenia   - Elevated blasts 10-13%  - 2 of 4 CRAB, will rule-out monoclonal gammopathy    - Hx of retinal hemorrhage 2/2 ?pentoxylline   - Hx of left leg blood clot on Xarelto, unclear hypercoagulability      #) PLAN  - s/p 2 U PRBC (11/06)  - PBS shows occasional smudge cells, no schistocytes.   - Send SPEP, UPEP, Immunofixation, Immunoglobulin panel, FLC  - Send Iron studies, B12, Folate, reticulocyte, haptoglobin, LDH, flow cytometry   - Maintain active T+S, transfuse if Hb < 7, Platelet < 10K or < 20K if actively bleeding   - Recommend bone marrow biopsy as outpatient for further diagnostic evaluation   - Primary team to obtain collaterals from [facility] regarding medical history of blood clots and indication for Xarelto.     - To discuss with Dr. Plaza    70 yr old M, Yi, with PMHx of HTN, hyperlipidemia, DM, Stage IV CKD (baseline Cr 1.98 from outpatient labs 9/2020), anemia of chronic disease, PVD, CAD (prior PCIs in ~2012) who presents to [facility] ED 11/5/20 c/o progressively worsening fatigue and intermittent chest pain x few days. Patient reports he received Retacrit injection 3 days ago by his nephrologist Dr. Hernandez. Patient’s Hgb was reportedly 7.7 last month, endoscopy/colonoscopy performed 10/29/20 were reportedly unremarkable.    #) DIAGNOSIS   - Severe macrocytic anemia, symptomatic [Baseline Hb 10 as of 2017 and 7.7 as of 10/2020]  - Grade 1 thrombocytopenia   - Elevated blasts 10-13%  - 2 of 4 CRAB, will rule-out monoclonal gammopathy    - Hx of retinal hemorrhage 2/2 ?pentoxylline   - Hx of left leg blood clot on Xarelto, unclear hypercoagulability      #) PLAN  - s/p 2 U PRBC (11/06)  - PBS shows occasional smudge cells, no schistocytes.   - Send SPEP, UPEP, Immunofixation, Immunoglobulin panel, FLC  - Send Iron studies, B12, Folate, reticulocyte, haptoglobin, LDH, flow cytometry   - Maintain active T+S, transfuse if Hb < 7, Platelet < 10K or < 20K if actively bleeding   - Recommend bone marrow biopsy as outpatient for further diagnostic evaluation. He has an outpatient hematologist (Dr. Parker at [facility])   - Primary team to obtain collaterals from [facility] regarding medical history of blood clots and indication for Xarelto.     Discussed with Dr. Plaza

## 2020-11-06 NOTE — ED ADULT NURSE REASSESSMENT NOTE - NS ED NURSE REASSESS COMMENT FT1
pt. is A&Ox3, breathing unlabored, VS stable, cardiac monitor in progress, pt. denies any pain, blood transfusion was initiated, s/s of blood transfusion reaction were explained to pt, pt. verbalized understanding. will monitor.

## 2020-11-06 NOTE — PROGRESS NOTE ADULT - SUBJECTIVE AND OBJECTIVE BOX
Interventional Cardiology PA Adult Progress Note    Subjective Assessment: Patient seen and examined at bedside, he states he is feeling better after receiving blood transfusion. chest pain improved.   ROS negative except per HPI and subjective  	  MEDICATIONS:  metoprolol succinate ER 25 milliGRAM(s) Oral daily  gabapentin 200 milliGRAM(s) Oral two times a day  glucagon  Injectable 1 milliGRAM(s) IntraMuscular once PRN  insulin lispro (ADMELOG) corrective regimen sliding scale   SubCutaneous Before meals and at bedtime  rosuvastatin 10 milliGRAM(s) Oral at bedtime  calcitriol   Capsule 0.25 MICROGram(s) Oral daily  dextrose 5%. 1000 milliLiter(s) IV Continuous <Continuous>  influenza   Vaccine 0.5 milliLiter(s) IntraMuscular once	    [PHYSICAL EXAM:  TELEMETRY:  T(C): 36.7 (11-06-20 @ 08:30), Max: 36.9 (11-05-20 @ 23:48)  HR: 100 (11-06-20 @ 08:30) (98 - 119)  BP: 120/70 (11-06-20 @ 08:30) (109/71 - 130/72)  RR: 18 (11-06-20 @ 08:30) (18 - 20)  SpO2: 96% (11-06-20 @ 08:30) (94% - 99%)    I&O's Summary    Height (cm): 165.1 (11-06 @ 03:18)  Weight (kg): 70.8 (11-06 @ 03:18)  BMI (kg/m2): 26 (11-06 @ 03:18)  BSA (m2): 1.78 (11-06 @ 03:18)                                        Appearance: Normal	  HEENT:   Normal oral mucosa, PERRL, EOMI	  Neck: Supple, - JVD; Carotid Bruit   Cardiovascular: Normal S1 S2, No JVD, No murmurs,   Respiratory: b/l lower lobe rales  Gastrointestinal:  Soft, Non-tender, + BS	  Skin: No rashes, No ecchymoses, No cyanosis  Extremities: Normal range of motion, No clubbing, cyanosis or edema  Vascular: Peripheral pulses palpable 2+ bilaterally  Neurologic: Non-focal  Psychiatry: A & O x 3, Mood & affect appropriate  	    LABS:	 	  CARDIAC MARKERS:                        7.9    12.91 )-----------( 125      ( 06 Nov 2020 10:59 )             24.9     11-06    132<L>  |  101  |  46<H>  ----------------------------<  286<H>  4.7   |  17<L>  |  2.70<H>    Ca    8.8      06 Nov 2020 10:59  Mg     1.9     11-06    TPro  7.3  /  Alb  3.7  /  TBili  0.9  /  DBili  x   /  AST  24  /  ALT  7<L>  /  AlkPhos  66  11-06    proBNP: Serum Pro-Brain Natriuretic Peptide: 6492 pg/mL (11-06 @ 00:35)      PT/INR - ( 06 Nov 2020 00:35 )   PT: 16.1 sec;   INR: 1.36          PTT - ( 06 Nov 2020 00:35 )  PTT:21.7 sec    ASSESSMENT/PLAN: 	        DVT ppx:  Dispo:

## 2020-11-06 NOTE — PROGRESS NOTE ADULT - PROBLEM SELECTOR PLAN 4
stable, continue Toprol XL 25mg PO daily unsuccessful peripheral angioplasty in the past as per patient, on Xarelto/Statin at home.  - HOLDING anticoagulation.

## 2020-11-06 NOTE — H&P ADULT - HISTORY OF PRESENT ILLNESS
70 yr old M with PMHx of HTN, hyperlipidemia, DM, CKD (baseline Cr unknown), anemia, PVD, CAD with prior PCIs who presents to [facility] ED 11/5/20 c/o progressively worsening fatigue and intermittent chest pain x 5 days.   Patient also admits to recent nonexertional anterior chest pressure 5/10 in severity, each episode lasting a few minutes prior to subsiding. Associated symptoms including dizziness.  Patient states it has become difficult for him to ambulate secondary to the weakness.   Patient denies any N/V, palpitations, dizziness, PND, orthopnea, LE edema, recent travel or sick contacts. Patient states he received Retacrit injection 3 days ago by his renal doctor. Patient’s Hgb was reportedly 7.7 last month, endoscopy and colonoscopy that were unremarkable recently.     In ED, BP: 124/59, HR: 116, RR:18, Temp: 98.5F, O2 sat: 99% RA.   EKG revealed Sinus tachycardia@111BPM with ST elevation in aVR, V1, ST depressions I, II, III, AVF, V2-6. CXR with mild pulmonary vascular congestion.   Given concern for STEMI, Cathlab team activated. Patient immediately loaded with Brilinta 180mg PO x 1 dose and Heparin 5000units IV x 1 dose.  Labs returned revealing: WBC 10.63, H/H 4.8/16.1, Platelets 141, Na 129, K 5.6, BUN/Cr 50/3.03, Glucose 377, Troponin T 0.16, BNP 6492.  Upon review of EKG/Labs by interventional fellow, EKG changes likely demand ischemia from severe anemia rather than acute ACS.  Patient type and screened and ordered for 2 units pRBC.  Patient now admitted to [facility] cardiac telemetry, for management of suspected demand ischemia secondary to acute anemia.    70 yr old M with PMHx of HTN, hyperlipidemia, DM, Stage III CKD (baseline Cr 1.98 from outpatient labs 9/2020), anemia of chronic disease, PVD, CAD with prior PCIs in ~2012 who presents to [facility] ED 11/5/20 c/o progressively worsening fatigue and intermittent chest pain x few days. Patient also admits to recent nonexertional anterior chest pressure 5/10 in severity, with radiation to bilateral upper extremities. Each episode waxing and waning over several hours prior to subsiding. Patient denies any N/V, palpitations, PND, orthopnea, LE edema, recent travel or sick contacts. Patient states at baseline he can walk >10 blocks, however over the past few days has become difficult for him to ambulate >2 blocks secondary to the weakness and dizziness.   Patient reports he received Retacrit injection 3 days ago by his nephrologist Dr. Hernandez. Patient’s Hgb was reportedly 7.7 last month, endoscopy/colonoscopy performed 10/29/20 were reportedly unremarkable.    In ED, BP: 124/59, HR: 116, RR:18, Temp: 98.5F, O2 sat: 99% RA. EKG revealed Sinus tachycardia@111BPM with ST elevation in aVR, V1, ST depressions I, II, III, AVF, V2-6. CXR with mild pulmonary vascular congestion.   Given concern for STEMI, Cathlab team activated. Patient immediately loaded with Brilinta 180mg PO x 1 dose and Heparin 5000units IV x 1 dose.  Labs returned revealing: WBC 10.63, H/H 4.8/16.1, Platelets 141, Na 129, K 5.6, BUN/Cr 50/3.03, Glucose 377, Troponin T 0.16, BNP 6492.  Upon review of EKG/Labs by interventional fellow, EKG changes likely demand ischemia from severe anemia rather than acute ACS. Patient type and screened and ordered for transfusion with 2 units pRBC.  Patient now admitted to [facility] cardiac telemetry, for management of suspected demand ischemia secondary to acute anemia.    70 yr old M with PMHx of HTN, hyperlipidemia, DM, Stage III CKD (baseline Cr 1.98 from outpatient labs 9/2020), anemia of chronic disease, PVD, CAD with prior PCIs in ~2012 who presents to [facility] ED 11/5/20 c/o progressively worsening fatigue and intermittent chest pain x few days. Patient also admits to recent nonexertional anterior chest pressure/heaviness, 5/10 in severity, with radiation to bilateral upper extremities. Each episode waxing and waning over several hours prior to subsiding. Patient denies any N/V, palpitations, PND, orthopnea, LE edema, recent travel or sick contacts. Patient states at baseline he can walk >10 blocks, however over the past few days has become difficult for him to ambulate >2 blocks secondary to the weakness and dizziness.   Patient reports he received Retacrit injection 3 days ago by his nephrologist Dr. Hernandez. Patient’s Hgb was reportedly 7.7 last month, endoscopy/colonoscopy performed 10/29/20 were reportedly unremarkable.    In ED, BP: 124/59, HR: 116, RR:18, Temp: 98.5F, O2 sat: 99% RA. EKG revealed Sinus tachycardia@111BPM with ST elevation in aVR, V1, ST depressions I, II, III, AVF, V2-6. CXR with mild pulmonary vascular congestion.   Given concern for STEMI, Cathlab team activated. Patient immediately loaded with Brilinta 180mg PO x 1 dose and Heparin 5000units IV x 1 dose.  Labs returned revealing: WBC 10.63, H/H 4.8/16.1, Platelets 141, Na 129, K 5.6, BUN/Cr 50/3.03, Glucose 377, Troponin T 0.16, BNP 6492.  Upon review of EKG/Labs by interventional fellow, EKG changes likely demand ischemia from severe anemia rather than acute ACS. Patient type and screened and ordered for transfusion with 2 units pRBC.  Patient now admitted to [facility] cardiac telemetry, for management of suspected demand ischemia secondary to acute anemia.    70 yr old M with PMHx of HTN, hyperlipidemia, DM, Stage III CKD (baseline Cr 1.98 from outpatient labs 9/2020), anemia of chronic disease, PVD, CAD (prior PCIs in ~2012) who presents to [facility] ED 11/5/20 c/o progressively worsening fatigue and intermittent chest pain x few days. Patient also admits to recent nonexertional anterior chest pressure/heaviness, 5/10 in severity, with radiation to bilateral upper extremities. Each episode waxing and waning over several hours prior to subsiding. Patient denies any N/V, palpitations, PND, orthopnea, LE edema, recent travel or sick contacts. Patient states at baseline he can walk >10 blocks, however over the past few days has become difficult for him to ambulate >2 blocks secondary to the weakness and dizziness.   Patient reports he received Retacrit injection 3 days ago by his nephrologist Dr. Hernandez. Patient’s Hgb was reportedly 7.7 last month, endoscopy/colonoscopy performed 10/29/20 were reportedly unremarkable.    In ED, BP: 124/59, HR: 116, RR:18, Temp: 98.5F, O2 sat: 99% RA. EKG revealed Sinus tachycardia@111BPM with ST elevation in aVR, V1, ST depressions I, II, III, AVF, V2-6. CXR with mild pulmonary vascular congestion.   Given concern for STEMI, Cathlab team activated. Patient immediately loaded with Brilinta 180mg PO x 1 dose and Heparin 5000units IV x 1 dose.  Labs returned revealing: WBC 10.63, H/H 4.8/16.1, Platelets 141, Na 129, K 5.6, BUN/Cr 50/3.03, Glucose 377, Troponin T 0.16, BNP 6492.  Upon review of EKG/Labs by interventional fellow, EKG changes likely demand ischemia from severe anemia rather than acute ACS. Patient type and screened and ordered for transfusion with 2 units pRBC.  Patient now admitted to [facility] cardiac telemetry, for management of suspected demand ischemia secondary to acute anemia.    70 yr old M with PMHx of HTN, hyperlipidemia, DM, Stage IV CKD (baseline Cr 1.98 from outpatient labs 9/2020), anemia of chronic disease, PVD, CAD (prior PCIs in ~2012) who presents to [facility] ED 11/5/20 c/o progressively worsening fatigue and intermittent chest pain x few days. Patient also admits to recent nonexertional anterior chest pressure/heaviness, 5/10 in severity, with radiation to bilateral upper extremities. Each episode waxing and waning over several hours prior to subsiding. Patient denies any N/V, palpitations, PND, orthopnea, LE edema, recent travel or sick contacts. Patient states at baseline he can walk >10 blocks, however over the past few days has become difficult for him to ambulate >2 blocks secondary to the weakness and dizziness.   Patient reports he received Retacrit injection 3 days ago by his nephrologist Dr. Hernandez. Patient’s Hgb was reportedly 7.7 last month, endoscopy/colonoscopy performed 10/29/20 were reportedly unremarkable.    In ED, BP: 124/59, HR: 116, RR:18, Temp: 98.5F, O2 sat: 99% RA. EKG revealed Sinus tachycardia@111BPM with ST elevation in aVR, V1, ST depressions I, II, III, AVF, V2-6. CXR with mild pulmonary vascular congestion.   Given concern for STEMI, Cathlab team activated. Patient immediately loaded with Brilinta 180mg PO x 1 dose and Heparin 5000units IV x 1 dose.  Labs returned revealing: WBC 10.63, H/H 4.8/16.1, Platelets 141, Na 129, K 5.6, BUN/Cr 50/3.03, Glucose 377, Troponin T 0.16, BNP 6492.  Upon review of EKG/Labs by interventional fellow, EKG changes likely demand ischemia from severe anemia rather than acute ACS. Patient type and screened and ordered for transfusion with 2 units pRBC.  Patient now admitted to [facility] cardiac telemetry, for management of suspected demand ischemia secondary to acute anemia.

## 2020-11-06 NOTE — PROGRESS NOTE ADULT - PROBLEM SELECTOR PLAN 2
hx of PCI ~2012 @[facility] per patient, previously on Plavix/ASA. Now on ASA/Xarelto at home.  --patient reports his cardiologist Dr. Dr. Uzma Perry had performed EKG/Echo (both reportedly normal) in past week and cleared him for EGD/Colonoscopy which was performed on 10/29/20.  --will HOLD anticoagulation in setting of acute anemia, continue BB/Statin. H/H 4.8/16.1 upon admission, Baseline Hgb 8.4 (by labs 8/2020 per outpatient renal progress note).  - s/p 2 units PRBC today 11/6.   - Repeat H/H 7.9/24.9.  Will repeat H/H this afternoon.   - received Retacrit on 10/28/20 by nephrologist Dr. Hernandez (office called to obtain most recent Hgb results, awaiting call back)  --EGD/Colonoscopy performed 10/29/20 with Dr. Wolf Marlow, normal as per patient (office called, no official results yet, awaiting call back from MD for verbal report).   - GI service consulted, f/u recs, consider capsule study  - Heme service consulted.  f/u Iron studies, f/u recs.   - f/u Occult Stool (patient denies BRBPR and melena).

## 2020-11-06 NOTE — H&P ADULT - NSHPOUTPATIENTPROVIDERS_GEN_ALL_CORE
Cardiologist- Dr. Uzma Perry [phone]  Gasteroenterologist- Dr. Kala Marlow [phone] Cardiologist- Dr. Uzma Perry [phone]  Gasteroenterologist- Dr. Kala Marlow [phone]  Nephrologist- Dr. Hernandez

## 2020-11-06 NOTE — ED PROVIDER NOTE - OBJECTIVE STATEMENT
70M hx htn, dm, cad (stents), ckd, c/o generalized weakness. pt states worsening fatigue and decreased energy over past 5 days. states having some pain across chest over past 4 days. unable to ambulate d/t weakness, states feels like he had to rest a lot. no abd pain. no HA. no focal numbness/weakness. no fevers. no n/v. pt states received retacrit injection 3 days ago by his renal doctor. pt states had endoscopy and colonoscopy that were unremarkable recently. no bleeding 70M hx htn, dm, cad (stents), ckd, PVD, c/o generalized weakness. pt states worsening fatigue and decreased energy over past 5 days. states having some pain across chest over past 4 days. pt states feeling uncomfortable across chest. unable to ambulate d/t weakness, states feels like he had to rest a lot. no abd pain. no HA. no focal numbness/weakness. no fevers. no n/v. pt states received retacrit injection 3 days ago by his renal doctor. pt states had endoscopy and colonoscopy that were unremarkable recently. no overt bleeding no black stool.  pt currenlty on xarelto.

## 2020-11-06 NOTE — CONSULT NOTE ADULT - SUBJECTIVE AND OBJECTIVE BOX
LENGTH OF HOSPITAL STAY: 0    REASON FOR CONSULT: Anemia    HISTORY OF PRESENTING ILLNESS:   70 yr old M with PMHx of HTN, hyperlipidemia, DM, Stage IV CKD (baseline Cr 1.98 from outpatient labs 2020), anemia of chronic disease, PVD, CAD (prior PCIs in ~) who presents to [facility] ED 20 c/o progressively worsening fatigue and intermittent chest pain x few days. Patient also admits to recent nonexertional anterior chest pressure/heaviness, 5/10 in severity, with radiation to bilateral upper extremities. Each episode waxing and waning over several hours prior to subsiding. Patient denies any N/V, palpitations, PND, orthopnea, LE edema, recent travel or sick contacts. Patient states at baseline he can walk >10 blocks, however over the past few days has become difficult for him to ambulate >2 blocks secondary to the weakness and dizziness.   Patient reports he received Retacrit injection 3 days ago by his nephrologist Dr. Hernandez. Patient’s Hgb was reportedly 7.7 last month, endoscopy/colonoscopy performed 10/29/20 were reportedly unremarkable.    In ED, BP: 124/59, HR: 116, RR:18, Temp: 98.5F, O2 sat: 99% RA. EKG revealed Sinus tachycardia@111BPM with ST elevation in aVR, V1, ST depressions I, II, III, AVF, V2-6. CXR with mild pulmonary vascular congestion.   Given concern for STEMI, Cathlab team activated. Patient immediately loaded with Brilinta 180mg PO x 1 dose and Heparin 5000units IV x 1 dose.  Labs returned revealing: WBC 10.63, H/H 4.8/16.1, Platelets 141, Na 129, K 5.6, BUN/Cr 50/3.03, Glucose 377, Troponin T 0.16, BNP 6492.  Upon review of EKG/Labs by interventional fellow, EKG changes likely demand ischemia from severe anemia rather than acute ACS. Patient type and screened and ordered for transfusion with 2 units pRBC.  Patient now admitted to [facility] cardiac telemetry, for management of suspected demand ischemia secondary to acute anemia.    (2020 02:16)    PAST MEDICAL & SURGICAL HISTORY:  Herpes zoster  CKD (chronic kidney disease)  PVD (peripheral vascular disease)  CAD (coronary artery disease)  DM (diabetes mellitus)  HTN (hypertension)  H/O heart artery stent    SOCIAL HISTORY:    ALLERGIES:  No Known Allergies    MEDICATIONS:  STANDING MEDICATIONS  calcitriol   Capsule 0.25 MICROGram(s) Oral daily  dextrose 5%. 1000 milliLiter(s) IV Continuous <Continuous>  dextrose 50% Injectable 12.5 Gram(s) IV Push once  dextrose 50% Injectable 25 Gram(s) IV Push once  dextrose 50% Injectable 25 Gram(s) IV Push once  gabapentin 200 milliGRAM(s) Oral two times a day  insulin lispro (ADMELOG) corrective regimen sliding scale   SubCutaneous Before meals and at bedtime  metoprolol succinate ER 25 milliGRAM(s) Oral daily  rosuvastatin 10 milliGRAM(s) Oral at bedtime    PRN MEDICATIONS  dextrose 40% Gel 15 Gram(s) Oral once PRN  glucagon  Injectable 1 milliGRAM(s) IntraMuscular once PRN    VITALS:   T(F): 98.1  HR: 100  BP: 120/70  RR: 18  SpO2: 96%    LABS:                        4.8    10.63 )-----------( 141      ( 2020 00:35 )             16.1     11-    129<L>  |  99  |  50<H>  ----------------------------<  377<H>  5.6<H>   |  16<L>  |  3.03<H>    Ca    8.5      2020 00:35  Mg     1.9     11-    TPro  7.3  /  Alb  3.8  /  TBili  0.4  /  DBili  x   /  AST  37  /  ALT  8<L>  /  AlkPhos  61  11-    PT/INR - ( 2020 00:35 )   PT: 16.1 sec;   INR: 1.36       PTT - ( 2020 00:35 )  PTT:21.7 sec  Urinalysis Basic - ( 2020 04:08 )    Color: Yellow / Appearance: Clear / S.025 / pH: x  Gluc: x / Ketone: NEGATIVE  / Bili: Negative / Urobili: 0.2 E.U./dL   Blood: x / Protein: 30 mg/dL / Nitrite: NEGATIVE   Leuk Esterase: NEGATIVE / RBC: < 5 /HPF / WBC < 5 /HPF   Sq Epi: x / Non Sq Epi: 0-5 /HPF / Bacteria: Present /HPF    Troponin T, Serum: 0.16 ng/mL <HH> (20 @ 00:35)    CARDIAC MARKERS ( 2020 00:35 )  x     / 0.16 ng/mL / x     / x     / x        RADIOLOGY:  < from: Xray Chest 1 View-PORTABLE IMMEDIATE (Xray Chest 1 View-PORTABLE IMMEDIATE .) (20 @ 01:39) >  Suspect pulmonary congestion.    < end of copied text >    PHYSICAL EXAM:  GEN: No acute distress  HEENT:   LUNGS: Clear to auscultation bilaterally   HEART: S1/S2 present. RRR.   ABD: Soft, non-tender, non-distended. Bowel sounds present  EXT:  NEURO: AAOX3     LENGTH OF HOSPITAL STAY: 0    REASON FOR CONSULT: Anemia    HISTORY OF PRESENTING ILLNESS:   70 yr old M with PMHx of HTN, hyperlipidemia, DM, Stage IV CKD (baseline Cr 1.98 from outpatient labs 2020), anemia of chronic disease, PVD, CAD (prior PCIs in ~) who presents to [facility] ED 20 c/o progressively worsening fatigue and intermittent chest pain x few days. Patient also admits to recent nonexertional anterior chest pressure/heaviness, 5/10 in severity, with radiation to bilateral upper extremities. Each episode waxing and waning over several hours prior to subsiding. Patient denies any N/V, palpitations, PND, orthopnea, LE edema, recent travel or sick contacts. Patient states at baseline he can walk >10 blocks, however over the past few days has become difficult for him to ambulate >2 blocks secondary to the weakness and dizziness.   Patient reports he received Retacrit injection 3 days ago by his nephrologist Dr. Hernandez. Patient’s Hgb was reportedly 7.7 last month, endoscopy/colonoscopy performed 10/29/20 were reportedly unremarkable.    In ED, BP: 124/59, HR: 116, RR:18, Temp: 98.5F, O2 sat: 99% RA. EKG revealed Sinus tachycardia@111BPM with ST elevation in aVR, V1, ST depressions I, II, III, AVF, V2-6. CXR with mild pulmonary vascular congestion.   Given concern for STEMI, Cathlab team activated. Patient immediately loaded with Brilinta 180mg PO x 1 dose and Heparin 5000units IV x 1 dose.  Labs returned revealing: WBC 10.63, H/H 4.8/16.1, Platelets 141, Na 129, K 5.6, BUN/Cr 50/3.03, Glucose 377, Troponin T 0.16, BNP 6492.  Upon review of EKG/Labs by interventional fellow, EKG changes likely demand ischemia from severe anemia rather than acute ACS. Patient type and screened and ordered for transfusion with 2 units pRBC.  Patient now admitted to [facility] cardiac telemetry, for management of suspected demand ischemia secondary to acute anemia.    (2020 02:16)    He reports 5 lb weight loss over 10 days. He denies night sweats, fever, chills, fatigue, joint pain. He had an EGD/Colonoscopy for evaluation of anemia in the end of October which showed no polyps or bleeding. He sees a Hematologist Dr. Fonseca in [facility] who is aware of his anemia and thrombocytopenia. Says that they were mild and will continue to observe, no need for bone marrow biopsy. He also reports a history of blood clot in the left leg for which he was placed on Xarelto, now taking for the past 6 years. He was taken off Plavix 2 years after placement of 5 cardiac stents and remains on aspirin. He denies easy bruising, bleeding, hemoptysis, hematuria, hematemesis, melena, hematochezia. He denies gingival bleeding or family history of bleeding/clotting disorders.     Of note, he mentions that his Creatinine was normal 2 months ago. He was diagnosed only with renal failure 2 months ago. Nephrologist had said that the kidneys were failing due to DM2 and essential hypertension. However, A1c was low 6.0s as per patient.      PAST MEDICAL & SURGICAL HISTORY:  Herpes zoster  CKD (chronic kidney disease)  PVD (peripheral vascular disease)  CAD (coronary artery disease)  DM (diabetes mellitus)  HTN (hypertension)  H/O heart artery stent    SOCIAL HISTORY:  Never smoker  Social drinker  His father had prostate cancer,  at 74 yo.     ALLERGIES:  No Known Allergies    MEDICATIONS:  STANDING MEDICATIONS  calcitriol   Capsule 0.25 MICROGram(s) Oral daily  dextrose 5%. 1000 milliLiter(s) IV Continuous <Continuous>  dextrose 50% Injectable 12.5 Gram(s) IV Push once  dextrose 50% Injectable 25 Gram(s) IV Push once  dextrose 50% Injectable 25 Gram(s) IV Push once  gabapentin 200 milliGRAM(s) Oral two times a day  insulin lispro (ADMELOG) corrective regimen sliding scale   SubCutaneous Before meals and at bedtime  metoprolol succinate ER 25 milliGRAM(s) Oral daily  rosuvastatin 10 milliGRAM(s) Oral at bedtime    PRN MEDICATIONS  dextrose 40% Gel 15 Gram(s) Oral once PRN  glucagon  Injectable 1 milliGRAM(s) IntraMuscular once PRN    VITALS:   T(F): 98.1  HR: 100  BP: 120/70  RR: 18  SpO2: 96%    LABS:                        4.8    10.63 )-----------( 141      ( 2020 00:35 )             16.1     11-06    129<L>  |  99  |  50<H>  ----------------------------<  377<H>  5.6<H>   |  16<L>  |  3.03<H>    Ca    8.5      2020 00:35  Mg     1.9         TPro  7.3  /  Alb  3.8  /  TBili  0.4  /  DBili  x   /  AST  37  /  ALT  8<L>  /  AlkPhos  61      PT/INR - ( 2020 00:35 )   PT: 16.1 sec;   INR: 1.36       PTT - ( 2020 00:35 )  PTT:21.7 sec  Urinalysis Basic - ( 2020 04:08 )    Color: Yellow / Appearance: Clear / S.025 / pH: x  Gluc: x / Ketone: NEGATIVE  / Bili: Negative / Urobili: 0.2 E.U./dL   Blood: x / Protein: 30 mg/dL / Nitrite: NEGATIVE   Leuk Esterase: NEGATIVE / RBC: < 5 /HPF / WBC < 5 /HPF   Sq Epi: x / Non Sq Epi: 0-5 /HPF / Bacteria: Present /HPF    Troponin T, Serum: 0.16 ng/mL <HH> (20 @ 00:35)    CARDIAC MARKERS ( 2020 00:35 )  x     / 0.16 ng/mL / x     / x     / x        RADIOLOGY:  < from: Xray Chest 1 View-PORTABLE IMMEDIATE (Xray Chest 1 View-PORTABLE IMMEDIATE .) (20 @ 01:39) >  Suspect pulmonary congestion.    < end of copied text >    PHYSICAL EXAM:  GEN: No acute distress  HEENT: NCAT. Right neck bruit   LUNGS: Clear to auscultation bilaterally   HEART: S1/S2 present. Systolic murmur  ABD: Soft, non-tender, distended abdomen. Bowel sounds present. No hepatosplenomegaly  EXT: No pitting edema  NEURO: AAOX3

## 2020-11-06 NOTE — H&P ADULT - PROBLEM SELECTOR PLAN 6
BUN/Cr 50/3.03 upon admission, Cr noted to be 1.98 by outpatient labs 9/2020.  --will continue to avoid nephrotoxic agents.  --F/U UA and urine electrolytes.

## 2020-11-06 NOTE — ED PROVIDER NOTE - CLINICAL SUMMARY MEDICAL DECISION MAKING FREE TEXT BOX
generalized weakness, chest pain, decreased energy, fatigue, no resp distress, no abd pain  EKG concerning for STEMI. cath lab activated. Spoke with Dr. Loo - recommend heparin 5000U, brilinta 180mg, asa 325mg.  -labs sent  -cxr

## 2020-11-06 NOTE — H&P ADULT - NSHPLABSRESULTS_GEN_ALL_CORE
EKG revealed Sinus tachycardia@111BPM with ST elevation in aVR, V1, ST depressions I, II, III, AVF, V2-6

## 2020-11-06 NOTE — CONSULT NOTE ADULT - SUBJECTIVE AND OBJECTIVE BOX
GASTROENTEROLOGY CONSULT NOTE  HPI:  70 yr old M with PMHx of HTN, hyperlipidemia, DM, Stage IV CKD (baseline Cr 1.98 from outpatient labs 9/2020), anemia of chronic disease, PVD, CAD (prior PCIs in ~2012) who presents to [facility] ED 11/5/20 c/o progressively worsening fatigue and intermittent chest pain x few days. Patient also admits to recent nonexertional anterior chest pressure/heaviness, 5/10 in severity, with radiation to bilateral upper extremities. Each episode waxing and waning over several hours prior to subsiding. Patient denies any N/V, palpitations, PND, orthopnea, LE edema, recent travel or sick contacts. Patient states at baseline he can walk >10 blocks, however over the past few days has become difficult for him to ambulate >2 blocks secondary to the weakness and dizziness.   Patient reports he received Retacrit injection 3 days ago by his nephrologist Dr. Hernandez. Patient’s Hgb was reportedly 7.7 last month, endoscopy/colonoscopy performed 10/29/20 were reportedly unremarkable.    In ED, BP: 124/59, HR: 116, RR:18, Temp: 98.5F, O2 sat: 99% RA. EKG revealed Sinus tachycardia@111BPM with ST elevation in aVR, V1, ST depressions I, II, III, AVF, V2-6. CXR with mild pulmonary vascular congestion.   Given concern for STEMI, Cathlab team activated. Patient immediately loaded with Brilinta 180mg PO x 1 dose and Heparin 5000units IV x 1 dose.  Labs returned revealing: WBC 10.63, H/H 4.8/16.1, Platelets 141, Na 129, K 5.6, BUN/Cr 50/3.03, Glucose 377, Troponin T 0.16, BNP 6492.  Upon review of EKG/Labs by interventional fellow, EKG changes likely demand ischemia from severe anemia rather than acute ACS. Patient type and screened and ordered for transfusion with 2 units pRBC.  Patient [facility] cardiac [facility], for management of suspected demand ischemia secondary to acute anemia.    (06 Nov 2020 02:16)    Allergies    No Known Allergies    Intolerances      Home Medications:  aspirin 81 mg oral tablet: 1 tab(s) orally once a day (13 Oct 2014 07:45)  Aspirin Enteric Coated 81 mg oral delayed release tablet: 1 tab(s) orally once a day (06 Nov 2020 18:51)  calcitriol 0.25 mcg oral capsule: 1 cap(s) orally once a day (06 Nov 2020 18:51)  clopidogrel 75 mg oral tablet: 1 tab(s) orally once a day (14 Oct 2014 16:05)  gabapentin 100 mg oral capsule: 2 cap(s) orally 2 times a day (06 Nov 2020 18:51)  glimepiride 2 mg oral tablet: 1 tab(s) orally once a day (13 Oct 2014 07:45)  GlipiZIDE XL 10 mg oral tablet, extended release: 1 tab(s) orally once a day (06 Nov 2020 18:51)  irbesartan 75 mg oral tablet: 1 tab(s) orally once a day (13 Oct 2014 07:45)  Kombiglyze XR 1000 mg-2.5 mg oral tablet, extended release: 1 tab(s) orally once a day (in the evening) (13 Oct 2014 07:45)  metoprolol: 12.5 milligram(s) orally 2 times a day (14 Oct 2014 16:05)  Retacrit 40,000 units/mL preservative-free injectable solution: 1 milliliter(s) injectable once a month (06 Nov 2020 18:51)  rosuvastatin 10 mg oral tablet: 1 tab(s) orally once a day (06 Nov 2020 18:51)  simvastatin 20 mg oral tablet:  orally  (13 Oct 2014 07:45)  Toprol-XL 25 mg oral tablet, extended release: 1 tab(s) orally once a day (06 Nov 2020 18:51)  Vitamin D2 50,000 intl units (1.25 mg) oral capsule: 1 cap(s) orally once a week (06 Nov 2020 18:51)  Xarelto 2.5 mg oral tablet: 1 tab(s) orally 2 times a day (06 Nov 2020 18:51)    MEDICATIONS:  MEDICATIONS  (STANDING):  calcitriol   Capsule 0.25 MICROGram(s) Oral daily  dextrose 5%. 1000 milliLiter(s) (50 mL/Hr) IV Continuous <Continuous>  dextrose 50% Injectable 12.5 Gram(s) IV Push once  dextrose 50% Injectable 25 Gram(s) IV Push once  dextrose 50% Injectable 25 Gram(s) IV Push once  gabapentin 200 milliGRAM(s) Oral two times a day  influenza  Vaccine (HIGH DOSE) 0.7 milliLiter(s) IntraMuscular once  insulin lispro (ADMELOG) corrective regimen sliding scale   SubCutaneous Before meals and at bedtime  metoprolol succinate ER 25 milliGRAM(s) Oral daily  rosuvastatin 10 milliGRAM(s) Oral at bedtime    MEDICATIONS  (PRN):  dextrose 40% Gel 15 Gram(s) Oral once PRN Blood Glucose LESS THAN 70 milliGRAM(s)/deciliter  glucagon  Injectable 1 milliGRAM(s) IntraMuscular once PRN Glucose LESS THAN 70 milligrams/deciliter    PAST MEDICAL & SURGICAL HISTORY:  Herpes zoster    CKD (chronic kidney disease)    PVD (peripheral vascular disease)    CAD (coronary artery disease)    DM (diabetes mellitus)    HTN (hypertension)    Shingles    DM (diabetes mellitus)    HTN (hypertension)    HLD (hyperlipidemia)    H/O heart artery stent    No significant past surgical history      FAMILY HISTORY:  No pertinent family history in first degree relatives      SOCIAL HISTORY:  [place]: [ ] Current, [ ] Former, [ ] Never; Pack Years:  Alcohol:  Illicit Drugs:    REVIEW OF SYSTEMS:  CONSTITUTIONAL: No, fevers or chills  HEENT: No visual changes; No vertigo or throat pain   NECK: No pain or stiffness  RESPIRATORY: No cough, wheezing, hemoptysis; No shortness of breath  CARDIOVASCULAR: No chest pain or palpitations  GASTROINTESTINAL: as above  GENITOURINARY: No dysuria, frequency or hematuria  NEUROLOGICAL: No numbness or weakness  SKIN: No itching, burning, rashes, or lesions   All other 10 review of systems is negative unless indicated above.    Vital Signs Last 24 Hrs  T(C): 36.4 (06 Nov 2020 21:07), Max: 36.9 (05 Nov 2020 23:48)  T(F): 97.6 (06 Nov 2020 21:07), Max: 98.5 (05 Nov 2020 23:48)  HR: 89 (06 Nov 2020 20:20) (85 - 119)  BP: 140/66 (06 Nov 2020 20:20) (109/71 - 140/66)  BP(mean): 80 (06 Nov 2020 02:16) (80 - 80)  RR: 18 (06 Nov 2020 20:20) (18 - 20)  SpO2: 99% (06 Nov 2020 20:20) (94% - 99%)    11-06 @ 07:01  -  11-06 @ 21:27  --------------------------------------------------------  IN: 0 mL / OUT: 1700 mL / NET: -1700 mL        PHYSICAL EXAM:    General: Well developed; well nourished; in no acute distress  Eyes: Anicteric sclerae, moist conjunctivae  HENT: Moist mucous membranes  Neck: Trachea midline, supple  Lungs: Normal respiratory effort, no intercostal retractions  Cardiovascular: RRR  Abdomen: Soft, non-tender non-distended; Normal bowel sounds; No rebound or guarding  Extremities: Normal range of motion, No clubbing, cyanosis or edema  Neurological: Alert and oriented x3  Skin: Warm and dry. No obvious rash    LABS:                        7.9    12.91 )-----------( 125      ( 06 Nov 2020 10:59 )             24.9     11-06    132<L>  |  101  |  46<H>  ----------------------------<  286<H>  4.7   |  17<L>  |  2.70<H>    Ca    8.8      06 Nov 2020 10:59  Mg     1.8     11-06    TPro  7.3  /  Alb  3.7  /  TBili  0.9  /  DBili  x   /  AST  24  /  ALT  7<L>  /  AlkPhos  66  11-06        PT/INR - ( 06 Nov 2020 00:35 )   PT: 16.1 sec;   INR: 1.36          PTT - ( 06 Nov 2020 00:35 )  PTT:21.7 sec    RADIOLOGY & ADDITIONAL STUDIES:     Reviewed

## 2020-11-07 LAB
A1C WITH ESTIMATED AVERAGE GLUCOSE RESULT: 6.7 % — HIGH (ref 4–5.6)
ANION GAP SERPL CALC-SCNC: 16 MMOL/L — SIGNIFICANT CHANGE UP (ref 5–17)
ANISOCYTOSIS BLD QL: SLIGHT — SIGNIFICANT CHANGE UP
APTT BLD: 24.9 SEC — LOW (ref 27.5–35.5)
BASO STIPL BLD QL SMEAR: PRESENT — SIGNIFICANT CHANGE UP
BASOPHILS # BLD AUTO: 0 K/UL — SIGNIFICANT CHANGE UP (ref 0–0.2)
BASOPHILS NFR BLD AUTO: 0 % — SIGNIFICANT CHANGE UP (ref 0–2)
BLASTS # FLD: 12.4 % — HIGH (ref 0–0)
BUN SERPL-MCNC: 43 MG/DL — HIGH (ref 7–23)
CALCIUM SERPL-MCNC: 8.9 MG/DL — SIGNIFICANT CHANGE UP (ref 8.4–10.5)
CALCIUM UR-MCNC: 3.3 MG/DL — SIGNIFICANT CHANGE UP
CHLORIDE SERPL-SCNC: 101 MMOL/L — SIGNIFICANT CHANGE UP (ref 96–108)
CK MB CFR SERPL CALC: 3.5 NG/ML — SIGNIFICANT CHANGE UP (ref 0–6.7)
CK SERPL-CCNC: 61 U/L — SIGNIFICANT CHANGE UP (ref 30–200)
CO2 SERPL-SCNC: 19 MMOL/L — LOW (ref 22–31)
CREAT SERPL-MCNC: 2.63 MG/DL — HIGH (ref 0.5–1.3)
CULTURE RESULTS: NO GROWTH — SIGNIFICANT CHANGE UP
EOSINOPHIL # BLD AUTO: 0 K/UL — SIGNIFICANT CHANGE UP (ref 0–0.5)
EOSINOPHIL NFR BLD AUTO: 0 % — SIGNIFICANT CHANGE UP (ref 0–6)
ESTIMATED AVERAGE GLUCOSE: 146 MG/DL — HIGH (ref 68–114)
FOLATE SERPL-MCNC: >20 NG/ML — SIGNIFICANT CHANGE UP
GIANT PLATELETS BLD QL SMEAR: PRESENT — SIGNIFICANT CHANGE UP
GLUCOSE BLDC GLUCOMTR-MCNC: 134 MG/DL — HIGH (ref 70–99)
GLUCOSE BLDC GLUCOMTR-MCNC: 237 MG/DL — HIGH (ref 70–99)
GLUCOSE BLDC GLUCOMTR-MCNC: 320 MG/DL — HIGH (ref 70–99)
GLUCOSE BLDC GLUCOMTR-MCNC: 358 MG/DL — HIGH (ref 70–99)
GLUCOSE SERPL-MCNC: 180 MG/DL — HIGH (ref 70–99)
HCT VFR BLD CALC: 25.2 % — LOW (ref 39–50)
HCV AB S/CO SERPL IA: 0.1 S/CO — SIGNIFICANT CHANGE UP
HCV AB SERPL-IMP: SIGNIFICANT CHANGE UP
HGB BLD-MCNC: 7.9 G/DL — LOW (ref 13–17)
HYPOCHROMIA BLD QL: SLIGHT — SIGNIFICANT CHANGE UP
INR BLD: 1.17 — HIGH (ref 0.88–1.16)
LYMPHOCYTES # BLD AUTO: 1.51 K/UL — SIGNIFICANT CHANGE UP (ref 1–3.3)
LYMPHOCYTES # BLD AUTO: 16.8 % — SIGNIFICANT CHANGE UP (ref 13–44)
MACROCYTES BLD QL: SLIGHT — SIGNIFICANT CHANGE UP
MAGNESIUM UR-MCNC: 3.6 MG/DL — SIGNIFICANT CHANGE UP
MANUAL SMEAR VERIFICATION: SIGNIFICANT CHANGE UP
MCHC RBC-ENTMCNC: 29.4 PG — SIGNIFICANT CHANGE UP (ref 27–34)
MCHC RBC-ENTMCNC: 31.3 GM/DL — LOW (ref 32–36)
MCV RBC AUTO: 93.7 FL — SIGNIFICANT CHANGE UP (ref 80–100)
METAMYELOCYTES # FLD: 3.5 % — HIGH (ref 0–0)
MICROCYTES BLD QL: SLIGHT — SIGNIFICANT CHANGE UP
MONOCYTES # BLD AUTO: 1.36 K/UL — HIGH (ref 0–0.9)
MONOCYTES NFR BLD AUTO: 15.1 % — HIGH (ref 2–14)
MYELOCYTES NFR BLD: 5.3 % — HIGH (ref 0–0)
NEUTROPHILS # BLD AUTO: 4.23 K/UL — SIGNIFICANT CHANGE UP (ref 1.8–7.4)
NEUTROPHILS NFR BLD AUTO: 43.4 % — SIGNIFICANT CHANGE UP (ref 43–77)
NEUTS BAND # BLD: 3.5 % — SIGNIFICANT CHANGE UP (ref 0–8)
NRBC # BLD: 2 /100 WBCS — HIGH (ref 0–0)
NRBC # BLD: 4 /100 — HIGH (ref 0–0)
OVALOCYTES BLD QL SMEAR: SLIGHT — SIGNIFICANT CHANGE UP
PHOSPHATE 24H UR-MCNC: 12.9 MG/DL — SIGNIFICANT CHANGE UP
PLAT MORPH BLD: ABNORMAL
PLATELET # BLD AUTO: 130 K/UL — LOW (ref 150–400)
POIKILOCYTOSIS BLD QL AUTO: SLIGHT — SIGNIFICANT CHANGE UP
POLYCHROMASIA BLD QL SMEAR: SLIGHT — SIGNIFICANT CHANGE UP
POTASSIUM SERPL-MCNC: 4.6 MMOL/L — SIGNIFICANT CHANGE UP (ref 3.5–5.3)
POTASSIUM SERPL-SCNC: 4.6 MMOL/L — SIGNIFICANT CHANGE UP (ref 3.5–5.3)
PROTHROM AB SERPL-ACNC: 13.9 SEC — HIGH (ref 10.6–13.6)
RBC # BLD: 2.69 M/UL — LOW (ref 4.2–5.8)
RBC # FLD: 19.3 % — HIGH (ref 10.3–14.5)
RBC BLD AUTO: ABNORMAL
SARS-COV-2 IGG SERPL QL IA: NEGATIVE — SIGNIFICANT CHANGE UP
SARS-COV-2 IGM SERPL IA-ACNC: <0.1 INDEX — SIGNIFICANT CHANGE UP
SMUDGE CELLS # BLD: PRESENT — SIGNIFICANT CHANGE UP
SODIUM SERPL-SCNC: 136 MMOL/L — SIGNIFICANT CHANGE UP (ref 135–145)
SPECIMEN SOURCE: SIGNIFICANT CHANGE UP
SPHEROCYTES BLD QL SMEAR: SLIGHT — SIGNIFICANT CHANGE UP
TARGETS BLD QL SMEAR: SLIGHT — SIGNIFICANT CHANGE UP
TROPONIN T SERPL-MCNC: 0.26 NG/ML — CRITICAL HIGH (ref 0–0.01)
VIT B12 SERPL-MCNC: 1963 PG/ML — HIGH (ref 232–1245)
WBC # BLD: 9.01 K/UL — SIGNIFICANT CHANGE UP (ref 3.8–10.5)
WBC # FLD AUTO: 9.01 K/UL — SIGNIFICANT CHANGE UP (ref 3.8–10.5)

## 2020-11-07 PROCEDURE — 99233 SBSQ HOSP IP/OBS HIGH 50: CPT

## 2020-11-07 PROCEDURE — 93306 TTE W/DOPPLER COMPLETE: CPT | Mod: 26

## 2020-11-07 RX ADMIN — GABAPENTIN 200 MILLIGRAM(S): 400 CAPSULE ORAL at 17:27

## 2020-11-07 RX ADMIN — CALCITRIOL 0.25 MICROGRAM(S): 0.5 CAPSULE ORAL at 10:07

## 2020-11-07 RX ADMIN — ROSUVASTATIN CALCIUM 10 MILLIGRAM(S): 5 TABLET ORAL at 21:52

## 2020-11-07 RX ADMIN — GABAPENTIN 200 MILLIGRAM(S): 400 CAPSULE ORAL at 05:40

## 2020-11-07 RX ADMIN — Medication 25 MILLIGRAM(S): at 05:40

## 2020-11-07 RX ADMIN — Medication 8: at 17:26

## 2020-11-07 RX ADMIN — Medication 10: at 11:44

## 2020-11-07 RX ADMIN — Medication 4: at 06:59

## 2020-11-07 NOTE — PROGRESS NOTE ADULT - PROBLEM SELECTOR PLAN 3
hx of PCI ~2012 [facility] per patient, previously on Plavix/ASA. Now on ASA/Xarelto at home.  --patient reports his cardiologist Dr. Dr. Uzma Perry had performed EKG/Echo (both reportedly normal) in past week and cleared him for EGD/Colonoscopy which was performed on 10/29/20.  - will HOLD low dose Xarelto and ASA in setting of acute anemia, continue BB/Statin.

## 2020-11-07 NOTE — PROGRESS NOTE ADULT - PROBLEM SELECTOR PLAN 4
unsuccessful peripheral angioplasty in the past as per patient, on low dose Xarelto/Statin at home.  - HOLDING Xarelto 2.5mg PO BID.  Will hold on discharge as well.

## 2020-11-07 NOTE — PROGRESS NOTE ADULT - ATTENDING COMMENTS
Attending Attestation:    I have personally seen, examined, and participated in the care of this patient.  I have reviewed all pertinent clinical information, including history, physical exam, plan and the PA’s note and agree except as noted.    I was physically present for the key portions of the evaluation and management (E/M) service provided.  I agree with the above history, physical, and plan which I have reviewed and edited where appropriate.     35 minutes spent on total encounter; more than 50% of the visit was spent counseling and/or coordinating care by the attending physician.     Plan discussed with cardiac team.

## 2020-11-07 NOTE — PROGRESS NOTE ADULT - PROBLEM SELECTOR PLAN 6
BUN/Cr 50/3.03 upon admission, Crt baseline noted to be 1.98 by outpatient labs 9/2020.  - recently underwent retacrit injection w/ his nephrologist Dr. Hernandez.  Office called to obtain most recent Crt level, awaiting call back.   - Continue to monitor renal function.  currently stable and trending down.   - will continue to avoid nephrotoxic agents.

## 2020-11-07 NOTE — PROGRESS NOTE ADULT - SUBJECTIVE AND OBJECTIVE BOX
Interventional Cardiology PA Adult Progress Note    Subjective Assessment: Patient seen and examined at bedside, he is feeling well without compalints  ROS negaive except per HPI and subjective  	  MEDICATIONS:  metoprolol succinate ER 25 milliGRAM(s) Oral daily  gabapentin 200 milliGRAM(s) Oral two times a day  glucagon  Injectable 1 milliGRAM(s) IntraMuscular once PRN  insulin lispro (ADMELOG) corrective regimen sliding scale   SubCutaneous Before meals and at bedtime  rosuvastatin 10 milliGRAM(s) Oral at bedtime  citriol   Capsule 0.25 MICROGram(s) Oral daily  dextrose 5%. 1000 milliLiter(s) IV Continuous <Continuous>  influenza  Vaccine (HIGH DOSE) 0.7 milliLiter(s) IntraMuscular once	    [PHYSICAL EXAM:  TELEMETRY:  T(C): 36.3 (11-07-20 @ 17:45), Max: 36.6 (11-07-20 @ 06:12)  HR: 84 (11-07-20 @ 16:17) (84 - 91)  BP: 129/58 (11-07-20 @ 16:17) (113/56 - 140/66)  RR: 18 (11-07-20 @ 16:17) (18 - 18)  SpO2: 97% (11-07-20 @ 16:17) (97% - 99%)    I&O's Summary    06 Nov 2020 07:01  -  07 Nov 2020 07:00  --------------------------------------------------------  IN: 0 mL / OUT: 1700 mL / NET: -1700 mL    07 Nov 2020 07:01  -  07 Nov 2020 19:39  --------------------------------------------------------  IN: 480 mL / OUT: 0 mL / NET: 480 mL                                         Appearance: Normal	  HEENT:   Normal oral mucosa, PERRL, EOMI	  Neck: Supple,  - JVD; Carotid Bruit   Cardiovascular: Normal S1 S2, No JVD, No murmurs,   Respiratory: Lungs clear to auscultation//No Rales, Rhonchi, Wheezing	  Gastrointestinal:  Soft, Non-tender, + BS	  Skin: No rashes, No ecchymoses, No cyanosis  Extremities: Normal range of motion, No clubbing, cyanosis or edema  Vascular: Peripheral pulses palpable 2+ bilaterally  Neurologic: Non-focal  Psychiatry: A & O x 3, Mood & affect appropriate      	    LABS:	 	  CARDIAC MARKERS:                   7.9    9.01  )-----------( 130      ( 07 Nov 2020 05:56 )             25.2     11-07    136  |  101  |  43<H>  ----------------------------<  180<H>  4.6   |  19<L>  |  2.63<H>    Ca    8.9      07 Nov 2020 05:56  Mg     1.8     11-06    TPro  7.3  /  Alb  3.7  /  TBili  0.9  /  DBili  x   /  AST  24  /  ALT  7<L>  /  AlkPhos  66  11-06    proBNP:   Lipid Profile:   HgA1c:   TSH:   PT/INR - ( 07 Nov 2020 05:56 )   PT: 13.9 sec;   INR: 1.17          PTT - ( 07 Nov 2020 05:56 )  PTT:24.9 sec    ASSESSMENT/PLAN: 	        DVT ppx:  Dispo:

## 2020-11-07 NOTE — PROGRESS NOTE ADULT - PROBLEM SELECTOR PLAN 7
Hgb A1c 6.7.  oral hypoglycemics on hold,   - will continue FS's and ICS PRN.  - elevated glucose during stay, no steroids, on diabetic diet, increase sliding scale to moderate.     VTE PPx: SCDs  PT consulted: F/U recs
oral hypoglycemics on hold, will continue FS's and ICS PRN.    VTE PPx: SCDs  PT consulted: F/U recs

## 2020-11-07 NOTE — PROGRESS NOTE ADULT - PROBLEM SELECTOR PLAN 2
H/H 4.8/16.1 upon admission, Baseline Hgb 8.4 (by labs 8/2020 per outpatient renal progress note).  - s/p 2 units PRBC today 11/6.   - Repeat H/H 7.9/24.9.  H/H remains stable today 7.9/25.2  - received Retacrit on 10/28/20 by nephrologist Dr. Hernandez (office called to obtain most recent Hgb results, awaiting call back)  - EGD/Colonoscopy performed 10/29/20 with Dr. Wolf Marlow, obtained verbal report on 11/6 from MD with EGD w/ hiatal hernia, gastritis and colonoscopy w/ internal hemorrhoids, no active bleeding.   - Awaiting stool occult.  denies any BRBPR and melena.   - GI service consulted, per recs no overt GI bleeding, signed off.  recs f/u with outpatient GI for Capsule study as outpatient.   - Heme service consulted. - Severe macrocytic anemia, Grade 1 thrombocytopenia, Elevated blasts 10-13%.  Further Heme blood work sent w/ AM labs today, awaiting all results.  Heme recs outpatient bone marrow biopsy (patient follows Heme Dr. Parker at [facility])  - Patient denies Hx of DVT, and history of PAD for which he was taking Xarelto 2.5mg PO BID, now discontinued.   - transfuse for Hg <7

## 2020-11-07 NOTE — PROGRESS NOTE ADULT - PROBLEM SELECTOR PLAN 1
currently chest pain free, feeling improved after blood transfusion  - Initial EKG revealed Sinus tachycardia @111BPM with ST elevation in aVR, V1, ST depressions I, II, III, AVF, V2-6.  Repeat EKG 11/6 showed almost resolved ST elevations and improved ST depressions.   - Given concern for STEMI, Cathlab team activated. Patient immediately loaded with Brilinta 180mg PO x 1 dose and Heparin 5000units IV x 1 dose.  STEMI code cancelled 2/2 Hgb 4.8.  Upon review EKG changes likely demand ischemia  from severe anemia rather then acute ACS.    - Echo 11/7: Mild symmetric left ventricular hypertrophy, Hyperdynamic left ventricular systolic function, Normal right ventricular size and systolic function, no significant valvular disease, No pericardial effusion.  - CE peaked 0.28 and trended down.

## 2020-11-07 NOTE — PROGRESS NOTE ADULT - ASSESSMENT
70 yr old M with PMHx of HTN, hyperlipidemia, DM, Stage IV CKD, anemia of chronic disease, PVD, Carotid stenosis, CAD (prior PCIs in ~2012) who presents to [facility] ED 11/5/20 c/o progressively worsening fatigue and intermittent chest pain x few days, EKG revealed Sinus tachycardia@111BPM with ST elevation in aVR, V1, ST depressions I, II, III, AVF, V2-6. Given concern for STEMI, Cathlab team activated. Patient immediately loaded with Brilinta 180mg PO x 1 dose and Heparin 5000units IV x 1 dose. Labs returned revealing: WBC 10.63, H/H 4.8/16.1, Platelets 141, BUN/Cr 50/3.03, Troponin T 0.16, BNP 6492.  Upon review of EKG/Labs by interventional fellow, EKG changes likely demand ischemia from severe anemia rather than acute ACS.  Patient admitted to cardiac tele for suspected demand ischemia secondary to acute anemia.  H/H remains stable.  Continue to monitor and likely plan for d/c for outpatient work up for chronic anemia.   Of note patient was underoing work up for chronic anemia as outpatient (last labs 8/2020 er renal note in HIE w/ Hgb 8.4), underwent pre op clearance by his cardiologist Dr. Uzma Perry per patient had EKG/Echo and was cleared for EGD/Colonoscopy.  HE is s/p EGD/colonoscopy with Dr. Wolf Marlow on 10/29/20 (reportedly normal per patient).  He followed up with his nephrologist Dr. Hernandez three days ago and received a retacrit injection.

## 2020-11-07 NOTE — PROGRESS NOTE ADULT - REASON FOR ADMISSION
progressively worsening fatigue and intermittent chest pain x few days
progressively worsening fatigue and intermittent chest pain x few days

## 2020-11-08 ENCOUNTER — TRANSCRIPTION ENCOUNTER (OUTPATIENT)
Age: 70
End: 2020-11-08

## 2020-11-08 VITALS
RESPIRATION RATE: 16 BRPM | HEART RATE: 96 BPM | DIASTOLIC BLOOD PRESSURE: 65 MMHG | SYSTOLIC BLOOD PRESSURE: 139 MMHG | OXYGEN SATURATION: 100 %

## 2020-11-08 LAB
ANION GAP SERPL CALC-SCNC: 17 MMOL/L — SIGNIFICANT CHANGE UP (ref 5–17)
BUN SERPL-MCNC: 49 MG/DL — HIGH (ref 7–23)
CALCIUM SERPL-MCNC: 8.6 MG/DL — SIGNIFICANT CHANGE UP (ref 8.4–10.5)
CHLORIDE SERPL-SCNC: 101 MMOL/L — SIGNIFICANT CHANGE UP (ref 96–108)
CO2 SERPL-SCNC: 18 MMOL/L — LOW (ref 22–31)
CREAT SERPL-MCNC: 2.7 MG/DL — HIGH (ref 0.5–1.3)
GLUCOSE BLDC GLUCOMTR-MCNC: 167 MG/DL — HIGH (ref 70–99)
GLUCOSE BLDC GLUCOMTR-MCNC: 292 MG/DL — HIGH (ref 70–99)
GLUCOSE SERPL-MCNC: 121 MG/DL — HIGH (ref 70–99)
HCT VFR BLD CALC: 25.9 % — LOW (ref 39–50)
HGB BLD-MCNC: 7.8 G/DL — LOW (ref 13–17)
MAGNESIUM SERPL-MCNC: 2 MG/DL — SIGNIFICANT CHANGE UP (ref 1.6–2.6)
MCHC RBC-ENTMCNC: 29.2 PG — SIGNIFICANT CHANGE UP (ref 27–34)
MCHC RBC-ENTMCNC: 30.1 GM/DL — LOW (ref 32–36)
MCV RBC AUTO: 97 FL — SIGNIFICANT CHANGE UP (ref 80–100)
NRBC # BLD: 2 /100 WBCS — HIGH (ref 0–0)
PLATELET # BLD AUTO: 125 K/UL — LOW (ref 150–400)
POTASSIUM SERPL-MCNC: 4.4 MMOL/L — SIGNIFICANT CHANGE UP (ref 3.5–5.3)
POTASSIUM SERPL-SCNC: 4.4 MMOL/L — SIGNIFICANT CHANGE UP (ref 3.5–5.3)
PROT ?TM UR-MCNC: 27 MG/DL — HIGH (ref 0–12)
RBC # BLD: 2.67 M/UL — LOW (ref 4.2–5.8)
RBC # FLD: 19.7 % — HIGH (ref 10.3–14.5)
SODIUM SERPL-SCNC: 136 MMOL/L — SIGNIFICANT CHANGE UP (ref 135–145)
WBC # BLD: 7.42 K/UL — SIGNIFICANT CHANGE UP (ref 3.8–10.5)
WBC # FLD AUTO: 7.42 K/UL — SIGNIFICANT CHANGE UP (ref 3.8–10.5)

## 2020-11-08 PROCEDURE — 85610 PROTHROMBIN TIME: CPT

## 2020-11-08 PROCEDURE — 85730 THROMBOPLASTIN TIME PARTIAL: CPT

## 2020-11-08 PROCEDURE — 86769 SARS-COV-2 COVID-19 ANTIBODY: CPT

## 2020-11-08 PROCEDURE — 71045 X-RAY EXAM CHEST 1 VIEW: CPT

## 2020-11-08 PROCEDURE — 82550 ASSAY OF CK (CPK): CPT

## 2020-11-08 PROCEDURE — 93306 TTE W/DOPPLER COMPLETE: CPT

## 2020-11-08 PROCEDURE — 36430 TRANSFUSION BLD/BLD COMPNT: CPT

## 2020-11-08 PROCEDURE — 85045 AUTOMATED RETICULOCYTE COUNT: CPT

## 2020-11-08 PROCEDURE — 97162 PT EVAL MOD COMPLEX 30 MIN: CPT

## 2020-11-08 PROCEDURE — 82340 ASSAY OF CALCIUM IN URINE: CPT

## 2020-11-08 PROCEDURE — 36415 COLL VENOUS BLD VENIPUNCTURE: CPT

## 2020-11-08 PROCEDURE — 82607 VITAMIN B-12: CPT

## 2020-11-08 PROCEDURE — 83690 ASSAY OF LIPASE: CPT

## 2020-11-08 PROCEDURE — 85025 COMPLETE CBC W/AUTO DIFF WBC: CPT

## 2020-11-08 PROCEDURE — 86923 COMPATIBILITY TEST ELECTRIC: CPT

## 2020-11-08 PROCEDURE — 84165 PROTEIN E-PHORESIS SERUM: CPT

## 2020-11-08 PROCEDURE — 84300 ASSAY OF URINE SODIUM: CPT

## 2020-11-08 PROCEDURE — 80053 COMPREHEN METABOLIC PANEL: CPT

## 2020-11-08 PROCEDURE — 84105 ASSAY OF URINE PHOSPHORUS: CPT

## 2020-11-08 PROCEDURE — 83010 ASSAY OF HAPTOGLOBIN QUANT: CPT

## 2020-11-08 PROCEDURE — 81001 URINALYSIS AUTO W/SCOPE: CPT

## 2020-11-08 PROCEDURE — 84156 ASSAY OF PROTEIN URINE: CPT

## 2020-11-08 PROCEDURE — 82784 ASSAY IGA/IGD/IGG/IGM EACH: CPT

## 2020-11-08 PROCEDURE — 93005 ELECTROCARDIOGRAM TRACING: CPT

## 2020-11-08 PROCEDURE — 83935 ASSAY OF URINE OSMOLALITY: CPT

## 2020-11-08 PROCEDURE — 83521 IG LIGHT CHAINS FREE EACH: CPT

## 2020-11-08 PROCEDURE — 83540 ASSAY OF IRON: CPT

## 2020-11-08 PROCEDURE — 85007 BL SMEAR W/DIFF WBC COUNT: CPT

## 2020-11-08 PROCEDURE — 87086 URINE CULTURE/COLONY COUNT: CPT

## 2020-11-08 PROCEDURE — 86850 RBC ANTIBODY SCREEN: CPT

## 2020-11-08 PROCEDURE — 84540 ASSAY OF URINE/UREA-N: CPT

## 2020-11-08 PROCEDURE — 86334 IMMUNOFIX E-PHORESIS SERUM: CPT

## 2020-11-08 PROCEDURE — 86900 BLOOD TYPING SEROLOGIC ABO: CPT

## 2020-11-08 PROCEDURE — 85027 COMPLETE CBC AUTOMATED: CPT

## 2020-11-08 PROCEDURE — 83735 ASSAY OF MAGNESIUM: CPT

## 2020-11-08 PROCEDURE — 99239 HOSP IP/OBS DSCHRG MGMT >30: CPT

## 2020-11-08 PROCEDURE — 83615 LACTATE (LD) (LDH) ENZYME: CPT

## 2020-11-08 PROCEDURE — 84133 ASSAY OF URINE POTASSIUM: CPT

## 2020-11-08 PROCEDURE — 96374 THER/PROPH/DIAG INJ IV PUSH: CPT

## 2020-11-08 PROCEDURE — 82436 ASSAY OF URINE CHLORIDE: CPT

## 2020-11-08 PROCEDURE — 83880 ASSAY OF NATRIURETIC PEPTIDE: CPT

## 2020-11-08 PROCEDURE — 99285 EMERGENCY DEPT VISIT HI MDM: CPT | Mod: 25

## 2020-11-08 PROCEDURE — 84155 ASSAY OF PROTEIN SERUM: CPT

## 2020-11-08 PROCEDURE — 86901 BLOOD TYPING SEROLOGIC RH(D): CPT

## 2020-11-08 PROCEDURE — P9016: CPT

## 2020-11-08 PROCEDURE — 82728 ASSAY OF FERRITIN: CPT

## 2020-11-08 PROCEDURE — 82553 CREATINE MB FRACTION: CPT

## 2020-11-08 PROCEDURE — 80048 BASIC METABOLIC PNL TOTAL CA: CPT

## 2020-11-08 PROCEDURE — 86803 HEPATITIS C AB TEST: CPT

## 2020-11-08 PROCEDURE — 82746 ASSAY OF FOLIC ACID SERUM: CPT

## 2020-11-08 PROCEDURE — 82962 GLUCOSE BLOOD TEST: CPT

## 2020-11-08 PROCEDURE — 84484 ASSAY OF TROPONIN QUANT: CPT

## 2020-11-08 PROCEDURE — 83550 IRON BINDING TEST: CPT

## 2020-11-08 PROCEDURE — 83036 HEMOGLOBIN GLYCOSYLATED A1C: CPT

## 2020-11-08 PROCEDURE — U0003: CPT

## 2020-11-08 RX ORDER — ASPIRIN/CALCIUM CARB/MAGNESIUM 324 MG
1 TABLET ORAL
Qty: 0 | Refills: 0 | DISCHARGE

## 2020-11-08 RX ORDER — RIVAROXABAN 15 MG-20MG
1 KIT ORAL
Qty: 0 | Refills: 0 | DISCHARGE

## 2020-11-08 RX ADMIN — Medication 6: at 12:36

## 2020-11-08 RX ADMIN — CALCITRIOL 0.25 MICROGRAM(S): 0.5 CAPSULE ORAL at 12:36

## 2020-11-08 RX ADMIN — Medication 2: at 06:52

## 2020-11-08 RX ADMIN — GABAPENTIN 200 MILLIGRAM(S): 400 CAPSULE ORAL at 05:16

## 2020-11-08 RX ADMIN — Medication 25 MILLIGRAM(S): at 05:15

## 2020-11-08 NOTE — DISCHARGE NOTE PROVIDER - PROVIDER TOKENS
FREE:[LAST:[[place]],FIRST:[Uzma],PHONE:[phone]],FAX:[(   )    -],ESTABLISHEDPATIENT:[T]],PROVIDER:[TOKEN:[[id number]],ESTABLISHEDPATIENT:[T]],FREE:[LAST:[Li],FIRST:[Dr Zamarripa],PHONE:[(   )    -],FAX:[(   )    -],ADDRESS:[Hematologist],ESTABLISHEDPATIENT:[T]]

## 2020-11-08 NOTE — DISCHARGE NOTE PROVIDER - NSDCCPCAREPLAN_GEN_ALL_CORE_FT
PRINCIPAL DISCHARGE DIAGNOSIS  Diagnosis: Anemia, unspecified type  Assessment and Plan of Treatment:        PRINCIPAL DISCHARGE DIAGNOSIS  Diagnosis: Anemia, unspecified type  Assessment and Plan of Treatment: You were found to be severely anemic with a hemoglobin of 4.8. You received 2 units of blood with improvement of your hemoglobin to 7.9 on the day of discharge. You were seen by the hospital hematology service who aparna labs and recommended follow up with your hematologist Dr Parker in 1-2 weeks to review the labwork and do a possible bone marrow biopsy. HOLD your aspirin 81 mg daily and Xarelto 2.5 mg twice daily UNTIL YOU ARE CLEARED TO RESTART THESE MEDICATIONS BY DR PARKER.      SECONDARY DISCHARGE DIAGNOSES  Diagnosis: PVD (peripheral vascular disease)  Assessment and Plan of Treatment: PLEASE HOLD ASPIRIN 81 mg daily AND XARELTO 2.5 mg twice daily UNTIL YOU ARE CLEARED TO RESTART THESE MEDICATIONS BY DR PARKER.

## 2020-11-08 NOTE — DISCHARGE NOTE PROVIDER - CARE PROVIDER_API CALL
Uzma ePrry  Phone: [phone]  Fax: (   )    -  Established Patient  Follow Up Time:     Wolf Marlow  GASTROENTEROLOGY  [address]  [place] [address]  Phone: [phone]  Fax: [phone]  Established Patient  Follow Up Time:     Dr Marilou Parker  Hematologist  Phone: (   )    -  Fax: (   )    -  Established Patient  Follow Up Time:

## 2020-11-08 NOTE — PHYSICAL THERAPY INITIAL EVALUATION ADULT - GENERAL OBSERVATIONS, REHAB EVAL
Pt received semi supine +hep lock +tele. PT received consent to treat from MYRON Arias. Pt is independent with transfers and ambulation, negotiated 12 stairs independent step over step. Pt no longer requires IP PT services as pt is independent. Pt was left as received with call bell in reach.

## 2020-11-08 NOTE — PHYSICAL THERAPY INITIAL EVALUATION ADULT - PERTINENT HX OF CURRENT PROBLEM, REHAB EVAL
Pt is 71 yo old male who presents to [facility] ED 11/5/20 c/o progressively worsening fatigue and intermittent chest pain x few days. Patient also admits to recent nonexertional anterior chest pressure/heaviness, 5/10 in severity, with radiation to bilateral upper extremities. Each episode waxing and waning over several hours prior to subsiding. Patient now admitted to [facility] cardiac telemetry, for management of suspected demand ischemia secondary to acute anemia.

## 2020-11-08 NOTE — DISCHARGE NOTE PROVIDER - HOSPITAL COURSE
70 yr old M with PMHx of HTN, hyperlipidemia, DM, Stage IV CKD (baseline Cr 1.98 from outpatient labs 9/2020), anemia of chronic disease, PVD, CAD (prior PCIs in ~2012) who presents to [facility] ED 11/5/20 c/o progressively worsening fatigue and intermittent chest pain x few days. Patient also admits to recent nonexertional anterior chest pressure/heaviness, 5/10 in severity, with radiation to bilateral upper extremities. Each episode waxing and waning over several hours prior to subsiding. Patient denies any N/V, palpitations, PND, orthopnea, LE edema, recent travel or sick contacts. Patient states at baseline he can walk >10 blocks, however over the past few days has become difficult for him to ambulate >2 blocks secondary to the weakness and dizziness.   Patient reports he received Retacrit injection 3 days ago by his nephrologist Dr. Hernandez. Patient’s Hgb was reportedly 7.7 last month, endoscopy/colonoscopy performed 10/29/20 were reportedly unremarkable.  In ED, BP: 124/59, HR: 116, RR:18, Temp: 98.5F, O2 sat: 99% RA. EKG revealed Sinus tachycardia@111BPM with ST elevation in aVR, V1, ST depressions I, II, III, AVF, V2-6. CXR with mild pulmonary vascular congestion.   Given concern for STEMI, Cathlab team activated. Patient immediately loaded with Brilinta 180mg PO x 1 dose and Heparin 5000units IV x 1 dose.  Labs returned revealing: WBC 10.63, H/H 4.8/16.1, Platelets 141, Na 129, K 5.6, BUN/Cr 50/3.03, Glucose 377, Troponin T 0.16, BNP 6492.  Upon review of EKG/Labs by interventional fellow, EKG changes likely demand ischemia from severe anemia rather than acute ACS. Patient type and screened and ordered for transfusion with 2 units pRBC.  Patient now admitted to [facility] cardiac telemetry, for management of suspected demand ischemia secondary to acute anemia.  H/H remains stable.  Continue to monitor and plan for d/c for outpatient work up for chronic anemia. Of note patient was undergoing work up for chronic anemia as outpatient (last labs 8/2020 er renal note in HIE w/ Hgb 8.4), underwent pre op clearance by his cardiologist Dr. Uzma Perry per patient had EKG/Echo and was cleared for EGD/Colonoscopy.  He is s/p EGD/colonoscopy with Dr. Wolf Marlow on 10/29/20 and as per verbal report hiatal hernia, gastritis and colonoscopy with internal hemorrhoids without any active bleeding.  He followed up with his nephrologist Dr. Hernandez three days prior to admission and received a retacrit injection. Heme/onc consulted for severe macrocytic anemia, symptomatic (Baseline Hb 10 as of 2017 and 7.7 as of 10/2020); Grade 1 thrombocytopenia. s/p 2 U PRBC (11/06)PBS shows occasional smudge cells, no schistocytes.   - Send SPEP, UPEP, Immunofixation, Immunoglobulin panel, FLC  - Send Iron studies, B12, Folate, reticulocyte, haptoglobin, LDH, flow cytometry   - Maintain active T+S, transfuse if Hb < 7, Platelet < 10K or < 20K if actively bleeding   - Recommend bone marrow biopsy as outpatient for further diagnostic evaluation. He has an outpatient hematologist (Dr. Parker at [facility])        70 yr old M with PMHx of HTN, hyperlipidemia, DM, Stage IV CKD (baseline Cr 1.98 from outpatient labs 9/2020), anemia of chronic disease, PVD, CAD (prior PCIs in ~2012) who presents to [facility] ED 11/5/20 c/o progressively worsening fatigue and intermittent chest pain x few days. Patient also admits to recent nonexertional anterior chest pressure/heaviness, 5/10 in severity, with radiation to bilateral upper extremities. Each episode waxing and waning over several hours prior to subsiding. Patient denies any N/V, palpitations, PND, orthopnea, LE edema, recent travel or sick contacts. Patient states at baseline he can walk >10 blocks, however over the past few days has become difficult for him to ambulate >2 blocks secondary to the weakness and dizziness. Patient reports he received Retacrit injection 3 days ago by his nephrologist Dr. Hernandez. Patient’s Hgb was reportedly 7.7 last month, endoscopy/colonoscopy performed 10/29/20 were reportedly unremarkable.  In ED, BP: 124/59, HR: 116, RR:18, Temp: 98.5F, O2 sat: 99% RA. EKG revealed Sinus tachycardia@111BPM with ST elevation in aVR, V1, ST depressions I, II, III, AVF, V2-6. CXR with mild pulmonary vascular congestion.  Given concern for STEMI, Cathlab team activated. Patient immediately loaded with Brilinta 180mg PO x 1 dose and Heparin 5000units IV x 1 dose.  Labs returned revealing: WBC 10.63, H/H 4.8/16.1, Platelets 141, Na 129, K 5.6, BUN/Cr 50/3.03, Glucose 377, Troponin T 0.16, BNP 6492.  Upon review of EKG/Labs by interventional fellow, EKG changes likely demand ischemia from severe anemia rather than acute ACS. Patient type and screened and ordered for transfusion with 2 units pRBC.  Patient now admitted to [facility] cardiac telemetry, for management of suspected demand ischemia secondary to acute anemia.  H/H remains stable.  Continue to monitor and plan for d/c for outpatient work up for chronic anemia. Of note patient was undergoing work up for chronic anemia as outpatient (last labs 8/2020 er renal note in HIE w/ Hgb 8.4), underwent pre op clearance by his cardiologist Dr. Uzma Perry per patient had EKG/Echo and was cleared for EGD/Colonoscopy.  He is s/p EGD/colonoscopy with Dr. Wolf Marlow on 10/29/20 and as per verbal report hiatal hernia, gastritis and colonoscopy with internal hemorrhoids without any active bleeding.  He followed up with his nephrologist Dr. Hernandez three days prior to admission and received a retacrit injection. Heme/onc consulted for severe macrocytic anemia, symptomatic (Baseline Hb 10 as of 2017 and 7.7 as of 10/2020); Grade 1 thrombocytopenia. s/p 2 U PRBC (11/06)PBS shows occasional smudge cells, no schistocytes. Sent SPEP, UPEP, Immunofixation, Immunoglobulin panel, FLC.  Sent Iron studies, B12, Folate, reticulocyte, haptoglobin, LDH, flow cytometry.  Maintained active T+S, transfuse if Hb < 7, Platelet < 10K or < 20K if actively bleeding. Patient recommend bone marrow biopsy as outpatient for further diagnostic evaluation. He has an outpatient hematologist (Dr. Parker at [facility]).         70 yr old M with PMHx of HTN, hyperlipidemia, DM, Stage IV CKD (baseline Cr 1.98 from outpatient labs 9/2020), anemia of chronic disease, PVD, CAD (prior PCIs in ~2012) who presents to [facility] ED 11/5/20 c/o progressively worsening fatigue and intermittent chest pain x few days. Patient also admits to recent nonexertional anterior chest pressure/heaviness, 5/10 in severity, with radiation to bilateral upper extremities. Each episode waxing and waning over several hours prior to subsiding. Patient denies any N/V, palpitations, PND, orthopnea, LE edema, recent travel or sick contacts. Patient states at baseline he can walk >10 blocks, however over the past few days has become difficult for him to ambulate >2 blocks secondary to the weakness and dizziness. Patient reports he received Retacrit injection 3 days ago by his nephrologist Dr. Hernandez. Patient’s Hgb was reportedly 7.7 last month, endoscopy/colonoscopy performed 10/29/20 were reportedly unremarkable. In ED, BP: 124/59, HR: 116, RR:18, Temp: 98.5F, O2 sat: 99% RA. EKG revealed Sinus tachycardia@111BPM with ST elevation in aVR, V1, ST depressions I, II, III, AVF, V2-6. CXR with mild pulmonary vascular congestion.  Given concern for STEMI, Cathlab team activated. Patient immediately loaded with Brilinta 180mg PO x 1 dose and Heparin 5000units IV x 1 dose.  Labs returned revealing: WBC 10.63, H/H 4.8/16.1, Platelets 141, Na 129, K 5.6, BUN/Cr 50/3.03, Glucose 377, Troponin T 0.16, BNP 6492.  Upon review of EKG/Labs by interventional fellow, EKG changes likely demand ischemia from severe anemia rather than acute ACS. Patient type and screened and ordered for transfusion with 2 units pRBC.  Patient now admitted to [facility] cardiac telemetry, for management of suspected demand ischemia secondary to acute anemia.  H/H remains stable.  Continue to monitor and plan for d/c for outpatient work up for chronic anemia. Of note patient was undergoing work up for chronic anemia as outpatient (last labs 8/2020 er renal note in HIE w/ Hgb 8.4), underwent pre op clearance by his cardiologist Dr. Uzma Perry per patient had EKG/Echo and was cleared for EGD/Colonoscopy.  He is s/p EGD/colonoscopy with Dr. Wolf Marlow on 10/29/20 and as per verbal report hiatal hernia, gastritis and colonoscopy with internal hemorrhoids without any active bleeding.  He followed up with his nephrologist Dr. Hernandez three days prior to admission and received a retacrit injection. Heme/onc consulted for severe macrocytic anemia, symptomatic (Baseline Hb 10 as of 2017 and 7.7 as of 10/2020); Grade 1 thrombocytopenia. s/p 2 U PRBC (11/06)PBS shows occasional smudge cells, no schistocytes. Sent SPEP, UPEP, Immunofixation, Immunoglobulin panel, FLC.  Sent Iron studies, B12, Folate, reticulocyte, haptoglobin, LDH, flow cytometry.  Maintained active T+S, transfuse if Hb < 7, Platelet < 10K or < 20K if actively bleeding. Patient recommend bone marrow biopsy as outpatient for further diagnostic evaluation. He has an outpatient hematologist (Dr. Parker at [facility]). Physical therapy evaluated and stable for home with no PT needs. 70 yr old M with PMHx of HTN, hyperlipidemia, DM, Stage IV CKD (baseline Cr 1.98 from outpatient labs 9/2020), anemia of chronic disease, PVD, CAD (prior PCIs in ~2012) who presents to [facility] ED 11/5/20 c/o progressively worsening fatigue and intermittent chest pain x few days. Patient also admits to recent nonexertional anterior chest pressure/heaviness, 5/10 in severity, with radiation to bilateral upper extremities. Each episode waxing and waning over several hours prior to subsiding. Patient denies any N/V, palpitations, PND, orthopnea, LE edema, recent travel or sick contacts. Patient states at baseline he can walk >10 blocks, however over the past few days has become difficult for him to ambulate >2 blocks secondary to the weakness and dizziness. Patient reports he received Retacrit injection 3 days ago by his nephrologist Dr. Hernandez. Patient’s Hgb was reportedly 7.7 last month, endoscopy/colonoscopy performed 10/29/20 were reportedly unremarkable. In ED, BP: 124/59, HR: 116, RR:18, Temp: 98.5F, O2 sat: 99% RA. EKG revealed Sinus tachycardia@111BPM with ST elevation in aVR, V1, ST depressions I, II, III, AVF, V2-6. CXR with mild pulmonary vascular congestion.  Given concern for STEMI, Cathlab team activated. Patient immediately loaded with Brilinta 180mg PO x 1 dose and Heparin 5000units IV x 1 dose. Labs returned revealing: WBC 10.63, H/H 4.8/16.1, Platelets 141, Na 129, K 5.6, BUN/Cr 50/3.03, Glucose 377, Troponin T 0.16, BNP 6492.  Upon review of EKG/Labs by interventional fellow, EKG changes likely demand ischemia from severe anemia rather than acute ACS. Patient type and screened and ordered for transfusion with 2 units pRBC.  Patient now admitted to [facility] cardiac telemetry, for management of suspected demand ischemia secondary to acute anemia.  H/H remains stable.  Continue to monitor and plan for d/c for outpatient work up for chronic anemia. Of note patient was undergoing work up for chronic anemia as outpatient (last labs 8/2020 er renal note in HIE w/ Hgb 8.4), underwent pre op clearance by his cardiologist Dr. Uzma Perry per patient had EKG/Echo and was cleared for EGD/Colonoscopy.  He is s/p EGD/colonoscopy with Dr. Wolf Marlow on 10/29/20 and as per verbal report hiatal hernia, gastritis and colonoscopy with internal hemorrhoids without any active bleeding.  He followed up with his nephrologist Dr. Hernandez three days prior to admission and received a retacrit injection. Heme/onc consulted for severe macrocytic anemia, symptomatic (Baseline Hb 10 as of 2017 and 7.7 as of 10/2020); Grade 1 thrombocytopenia. s/p 2 U PRBC (11/06)PBS shows occasional smudge cells, no schistocytes. Sent SPEP, UPEP, Immunofixation, Immunoglobulin panel, FLC.  Sent Iron studies, B12, Folate, reticulocyte, haptoglobin, LDH, flow cytometry.  Maintained active T+S, transfuse if Hb < 7, Platelet < 10K or < 20K if actively bleeding. Patient recommend bone marrow biopsy as outpatient for further diagnostic evaluation. He has an outpatient hematologist (Dr. Parker at [facility]). Physical therapy evaluated and stable for home with no PT needs.  Pt deemed stable for discharge per Dr Chacon and will follow up with his heme/onc Dr Parker, cardiologist Dr Uzma Perry, and GI Dr Marlow in 1-2 weeks.

## 2020-11-08 NOTE — DISCHARGE NOTE PROVIDER - NSDCMRMEDTOKEN_GEN_ALL_CORE_FT
aspirin 81 mg oral tablet: 1 tab(s) orally once a day  Aspirin Enteric Coated 81 mg oral delayed release tablet: 1 tab(s) orally once a day  Augmentin 875 mg-125 mg oral tablet: 1 milligram(s) orally 2 times a day   Bactrim  mg-160 mg oral tablet: 1 tab(s) orally 2 times a day   calcitriol 0.25 mcg oral capsule: 1 cap(s) orally once a day  clopidogrel 75 mg oral tablet: 1 tab(s) orally once a day  gabapentin 100 mg oral capsule: 2 cap(s) orally 2 times a day  glimepiride 2 mg oral tablet: 1 tab(s) orally once a day  GlipiZIDE XL 10 mg oral tablet, extended release: 1 tab(s) orally once a day  irbesartan 75 mg oral tablet: 1 tab(s) orally once a day  Kombiglyze XR 1000 mg-2.5 mg oral tablet, extended release: 1 tab(s) orally once a day (in the evening)  metoprolol: 12.5 milligram(s) orally 2 times a day  Retacrit 40,000 units/mL preservative-free injectable solution: 1 milliliter(s) injectable once a month  rosuvastatin 10 mg oral tablet: 1 tab(s) orally once a day  simvastatin 20 mg oral tablet:  orally   Toprol-XL 25 mg oral tablet, extended release: 1 tab(s) orally once a day  Vitamin D2 50,000 intl units (1.25 mg) oral capsule: 1 cap(s) orally once a week  Xarelto 2.5 mg oral tablet: 1 tab(s) orally 2 times a day   Augmentin 875 mg-125 mg oral tablet: 1 milligram(s) orally 2 times a day   Bactrim  mg-160 mg oral tablet: 1 tab(s) orally 2 times a day   calcitriol 0.25 mcg oral capsule: 1 cap(s) orally once a day  gabapentin 100 mg oral capsule: 2 cap(s) orally 2 times a day  glimepiride 2 mg oral tablet: 1 tab(s) orally once a day  GlipiZIDE XL 10 mg oral tablet, extended release: 1 tab(s) orally once a day  irbesartan 75 mg oral tablet: 1 tab(s) orally once a day  Kombiglyze XR 1000 mg-2.5 mg oral tablet, extended release: 1 tab(s) orally once a day (in the evening)  Retacrit 40,000 units/mL preservative-free injectable solution: 1 milliliter(s) injectable once a month  rosuvastatin 10 mg oral tablet: 1 tab(s) orally once a day  Toprol-XL 25 mg oral tablet, extended release: 1 tab(s) orally once a day  Vitamin D2 50,000 intl units (1.25 mg) oral capsule: 1 cap(s) orally once a week

## 2020-11-08 NOTE — DISCHARGE NOTE NURSING/CASE MANAGEMENT/SOCIAL WORK - PATIENT PORTAL LINK FT
You can access the FollowMyHealth Patient Portal offered by [facility] by registering at the following website: [email or web address]. By joining [employer]’s FollowMyHealth portal, you will also be able to view your health information using other applications (apps) compatible with our system.

## 2020-11-09 LAB
IGA FLD-MCNC: 822 MG/DL — HIGH (ref 84–499)
IGG FLD-MCNC: 1523 MG/DL — SIGNIFICANT CHANGE UP (ref 610–1660)
IGM SERPL-MCNC: 86 MG/DL — SIGNIFICANT CHANGE UP (ref 35–242)
KAPPA LC SER QL IFE: 11.71 MG/DL — HIGH (ref 0.33–1.94)
KAPPA LC SER QL IFE: 11.71 MG/DL — HIGH (ref 0.33–1.94)
KAPPA/LAMBDA FREE LIGHT CHAIN RATIO, SERUM: 1.61 RATIO — SIGNIFICANT CHANGE UP (ref 0.26–1.65)
KAPPA/LAMBDA FREE LIGHT CHAIN RATIO, SERUM: 1.61 RATIO — SIGNIFICANT CHANGE UP (ref 0.26–1.65)
LAMBDA LC SER QL IFE: 7.27 MG/DL — HIGH (ref 0.57–2.63)
LAMBDA LC SER QL IFE: 7.27 MG/DL — HIGH (ref 0.57–2.63)
PROT SERPL-MCNC: 7.1 G/DL — SIGNIFICANT CHANGE UP (ref 6–8.3)
PROT SERPL-MCNC: 7.1 G/DL — SIGNIFICANT CHANGE UP (ref 6–8.3)

## 2020-11-10 LAB
% ALBUMIN: 51.4 % — SIGNIFICANT CHANGE UP
% ALPHA 1: 4.5 % — SIGNIFICANT CHANGE UP
% ALPHA 2: 7.3 % — SIGNIFICANT CHANGE UP
% BETA: 14.8 % — SIGNIFICANT CHANGE UP
% GAMMA: 22 % — SIGNIFICANT CHANGE UP
ALBUMIN SERPL ELPH-MCNC: 3.6 G/DL — SIGNIFICANT CHANGE UP (ref 3.6–5.5)
ALBUMIN/GLOB SERPL ELPH: 1 RATIO — SIGNIFICANT CHANGE UP
ALPHA1 GLOB SERPL ELPH-MCNC: 0.3 G/DL — SIGNIFICANT CHANGE UP (ref 0.1–0.4)
ALPHA2 GLOB SERPL ELPH-MCNC: 0.5 G/DL — SIGNIFICANT CHANGE UP (ref 0.5–1)
B-GLOBULIN SERPL ELPH-MCNC: 1.1 G/DL — HIGH (ref 0.5–1)
GAMMA GLOBULIN: 1.6 G/DL — SIGNIFICANT CHANGE UP (ref 0.6–1.6)
INTERPRETATION SERPL IFE-IMP: SIGNIFICANT CHANGE UP
PROT PATTERN SERPL ELPH-IMP: SIGNIFICANT CHANGE UP

## 2020-11-11 LAB
% GAMMA, URINE: 19.7 % — SIGNIFICANT CHANGE UP
ALBUMIN 24H MFR UR ELPH: 21 % — SIGNIFICANT CHANGE UP
ALPHA1 GLOB 24H MFR UR ELPH: 27.3 % — SIGNIFICANT CHANGE UP
ALPHA2 GLOB 24H MFR UR ELPH: 17.3 % — SIGNIFICANT CHANGE UP
B-GLOBULIN 24H MFR UR ELPH: 14.7 % — SIGNIFICANT CHANGE UP
INTERPRETATION 24H UR IFE-IMP: SIGNIFICANT CHANGE UP
INTERPRETATION 24H UR IFE-IMP: SIGNIFICANT CHANGE UP
M PROTEIN 24H UR ELPH-MRATE: SIGNIFICANT CHANGE UP
PROT ?TM UR-MCNC: 27 MG/DL — HIGH (ref 0–12)
PROT PATTERN 24H UR ELPH-IMP: SIGNIFICANT CHANGE UP

## 2020-11-12 DIAGNOSIS — E11.51 TYPE 2 DIABETES MELLITUS WITH DIABETIC PERIPHERAL ANGIOPATHY WITHOUT GANGRENE: ICD-10-CM

## 2020-11-12 DIAGNOSIS — I12.9 HYPERTENSIVE CHRONIC KIDNEY DISEASE WITH STAGE 1 THROUGH STAGE 4 CHRONIC KIDNEY DISEASE, OR UNSPECIFIED CHRONIC KIDNEY DISEASE: ICD-10-CM

## 2020-11-12 DIAGNOSIS — I24.8 OTHER FORMS OF ACUTE ISCHEMIC HEART DISEASE: ICD-10-CM

## 2020-11-12 DIAGNOSIS — N18.4 CHRONIC KIDNEY DISEASE, STAGE 4 (SEVERE): ICD-10-CM

## 2020-11-12 DIAGNOSIS — E11.22 TYPE 2 DIABETES MELLITUS WITH DIABETIC CHRONIC KIDNEY DISEASE: ICD-10-CM

## 2020-11-12 DIAGNOSIS — Z79.84 LONG TERM (CURRENT) USE OF ORAL HYPOGLYCEMIC DRUGS: ICD-10-CM

## 2020-11-12 DIAGNOSIS — Z79.01 LONG TERM (CURRENT) USE OF ANTICOAGULANTS: ICD-10-CM

## 2020-11-12 DIAGNOSIS — Z79.82 LONG TERM (CURRENT) USE OF ASPIRIN: ICD-10-CM

## 2020-11-12 DIAGNOSIS — R53.1 WEAKNESS: ICD-10-CM

## 2020-11-12 DIAGNOSIS — E78.5 HYPERLIPIDEMIA, UNSPECIFIED: ICD-10-CM

## 2020-11-12 DIAGNOSIS — D63.1 ANEMIA IN CHRONIC KIDNEY DISEASE: ICD-10-CM

## 2020-11-12 DIAGNOSIS — I25.10 ATHEROSCLEROTIC HEART DISEASE OF NATIVE CORONARY ARTERY WITHOUT ANGINA PECTORIS: ICD-10-CM

## 2020-11-12 DIAGNOSIS — Z95.5 PRESENCE OF CORONARY ANGIOPLASTY IMPLANT AND GRAFT: ICD-10-CM

## 2020-11-12 DIAGNOSIS — D63.8 ANEMIA IN OTHER CHRONIC DISEASES CLASSIFIED ELSEWHERE: ICD-10-CM

## 2020-11-12 DIAGNOSIS — D69.6 THROMBOCYTOPENIA, UNSPECIFIED: ICD-10-CM

## 2021-01-19 ENCOUNTER — APPOINTMENT (OUTPATIENT)
Dept: VASCULAR SURGERY | Facility: CLINIC | Age: 71
End: 2021-01-19

## 2023-04-24 NOTE — PROGRESS NOTE ADULT - PROBLEM SELECTOR PLAN 1
currently chest pain free, Initial EKG revealed Sinus tachycardia@111BPM with ST elevation in aVR, V1, ST depressions I, II, III, AVF, V2-6.   --Given concern for STEMI, Cathlab team activated. Patient immediately loaded with Brilinta 180mg PO x 1 dose and Heparin 5000units IV x 1 dose.  --Labs returned:  Troponin T 0.16,  H/H 4.8/16.1.  --Upon review of EKG/Labs by interventional fellow, EKG changes likely demand ischemia from severe anemia rather than acute ACS.  --Obtain serial EKGs and Echo in AM currently chest pain free, feeling improved after blood transfusion  - Initial EKG revealed Sinus tachycardia @111BPM with ST elevation in aVR, V1, ST depressions I, II, III, AVF, V2-6.  f/u repeat EKG  - Given concern for STEMI, Cathlab team activated. Patient immediately loaded with Brilinta 180mg PO x 1 dose and Heparin 5000units IV x 1 dose.  STEMI code cancelled 2/2 Hgb 4.8.  Upon review EKG changes likely demand ischemia  from severe anemia rather then acute ACS.    - serial EKGs and Echo.   - CE 0.16 -> 0.26, f/u CE 4pm. (4) rarely moist

## 2023-10-25 NOTE — ED ADULT TRIAGE NOTE - SOURCE OF INFORMATION
Completed order faxed to [employer] [facility] [employer] with last ov note. Confirmation received.   Patient

## 2024-03-26 NOTE — ED ADULT NURSE NOTE - EXTENSIONS OF SELF_ADULT
ED Provider Note    CHIEF COMPLAINT  Chief Complaint   Patient presents with    Dizziness     Pt states he was a work started to feel dizzy had a syncopal episode and hit his head     Syncope    Elbow Pain     Left elbow     Hip Pain     Left hip pain        EXTERNAL RECORDS REVIEWED  Outpatient Notes he was seen at the urgent care and sent here.    HPI/ROS  LIMITATION TO HISTORY   Select: : None      Terrell Hensley is a 65 y.o. male who presents after passing out.  The patient states he was doing a lot more physical activity typically he does at work.  A lot of up-and-down movements.  He was feeling a little bit dizzy and lightheaded.  At 1 point he bent over to get something on the ground, and the next thing he knows he was getting up after having passed out.  He noticed some blood on the ground.  It sounds that he went to the occupational clinic/first-aid, was sent to urgent care and from there sent here.  He has headache on his left temple.  His neck is a little bit sore, on the sides.  No midline pain.  He denies any chest pain or shortness of breath.  Denies any belly pain.  No weakness or numbness.  Feels little bit tired presently but no other symptoms.  He is not passed out recently, he passed out perhaps 30 years ago.  He is otherwise healthy.  There is no new medication changes.  No recent illness.  There is no other complaint.    PAST MEDICAL HISTORY   None    SURGICAL HISTORY  patient denies any surgical history    FAMILY HISTORY  History reviewed. No pertinent family history.    SOCIAL HISTORY  Social History     Tobacco Use    Smoking status: Never    Smokeless tobacco: Never   Vaping Use    Vaping Use: Never used   Substance and Sexual Activity    Alcohol use: Yes     Comment: occ    Drug use: Never    Sexual activity: Not on file       CURRENT MEDICATIONS  Home Medications       Reviewed by Kathya Rowland R.N. (Registered Nurse) on 03/26/24 at 1039  Med List Status: Partial     Medication  Last Dose Status   amLODIPine (NORVASC) 10 MG Tab  Active   amLODIPine (NORVASC) 5 MG Tab  Active   amlodipine-benazepril (LOTREL) 5-10 MG per capsule  Active   chlorthalidone (HYGROTON) 25 MG Tab  Active   chlorthalidone (HYGROTON) 25 MG Tab  Active   clonazePAM (KLONOPIN) 1 MG Tab  Active   indomethacin (INDOCIN) 50 MG Cap  Active   oxybutynin (DITROPAN XL) 15 MG CR tablet  Active   oxybutynin (DITROPAN-XL) 15 MG CR tablet  Active   QUEtiapine (SEROQUEL) 100 MG Tab  Active   QUEtiapine (SEROQUEL) 25 MG Tab  Active   tamsulosin (FLOMAX) 0.4 MG capsule  Active   tamsulosin (FLOMAX) 0.4 MG capsule  Active                    ALLERGIES  Allergies   Allergen Reactions    Sulfa Drugs Hives and Rash       PHYSICAL EXAM  VITAL SIGNS: /64   Pulse 77   Temp 36.1 °C (97 °F) (Temporal)   Resp 19   Wt 88 kg (194 lb 0.1 oz)   SpO2 92%   BMI 27.06 kg/m²    Constitutional: Well appearing patient in no acute distress.  HENT: Head is notable for a 1 cm laceration over the left lateral orbital wall.  There is no surrounding hematoma.  No evidence of skull fracture.  Oropharynx is clear.  Mucous membranes are moist.  Eyes: Sclerae are nonicteric, pupils are equally round.  Neck: Supple with grossly normal range of motion.  Cardiovascular: Heart is regular rate and rhythm.  He has a blowing holosystolic murmur heard loudest at the left upper sternal border but also in the right upper sternal border, grade 2/6.  Peripheral pulses are intact and symmetric throughout.  Thorax & Lungs: Breathing easily.  Good air movement.  There is no wheeze, rhonchi or rales.  Abdomen: Bowel sounds normal, soft, non-distended, nontender, no mass nor pulsatile mass. I do not appreciate hepatosplenomegaly.  Skin: Laceration as above  Extremities: He has a contusion over the lateral left elbow, as well as his left greater trochanter.  No obvious deformity.  Neurologic: Alert. Moving all extremities.  Intact sensation and strength throughout.  Cranial nerves are normal.  Psychiatric: Normal for situation      DIAGNOSTIC STUDIES / PROCEDURES  Laceration repair  I have cleansed his wound.  The wound is relatively superficial and I do not suspect foreign body.  But would still benefit from closure.  I have applied Dermabond with excellent result.    EKG  I have independently interpreted this EKG  This is a twelve-lead Scytera my interpretation shows a normal sinus rhythm, normal rate.  There are no ST segment or T wave changes.  Intervals are grossly normal.  Impression: No STEMI.    LABS  Labs Reviewed   CBC WITH DIFFERENTIAL - Abnormal; Notable for the following components:       Result Value    WBC 12.0 (*)     Neutrophils-Polys 88.00 (*)     Lymphocytes 5.60 (*)     Neutrophils (Absolute) 10.59 (*)     Lymphs (Absolute) 0.67 (*)     All other components within normal limits   COMP METABOLIC PANEL - Abnormal; Notable for the following components:    Sodium 130 (*)     Chloride 92 (*)     Glucose 184 (*)     All other components within normal limits   ESTIMATED GFR   TROPONIN         RADIOLOGY  I have independently interpreted the diagnostic imaging associated with this visit and am waiting the final reading from the radiologist.   My preliminary interpretation is as follows: No fracture  Radiologist interpretation:   CT-HEAD W/O   Final Result      No acute intracranial abnormality.         DX-ELBOW-COMPLETE 3+ LEFT   Final Result      No fracture or dislocation of LEFT elbow.      DX-HIP-UNILATERAL-WITH PELVIS-1 VIEW LEFT   Final Result      No radiographic evidence of acute traumatic injury.      DX-CHEST-PORTABLE (1 VIEW)   Final Result      Hypoinflation without other evidence for acute cardiopulmonary disease.            COURSE & MEDICAL DECISION MAKING    ED Observation Status? No; Patient does not meet criteria for ED Observation.     INITIAL ASSESSMENT, COURSE AND PLAN  Care Narrative: The patient presents after an episode of syncope.  He has no  history of this previously.  His EKG is nondiagnostic.  I check laboratories.  This does show mild hyponatremia which we will correct.  His exam is notable for a murmur, it sounds suspicious for an aortic murmur.  As he is never had this evaluated before, I am concerned with his episode of syncope today that he should get emergent workup and observation.  I discussed this with Dr. Watkins, who agrees he will be seeing the patient for admission.  DISPOSITION AND DISCUSSIONS  I have discussed management of the patient with the following physicians and RACHEL's: Hospitalist    FINAL DIAGNOSIS  1. Syncope, unspecified syncope type    2. Facial laceration, initial encounter    3. Cardiac murmur    4. Hyponatremia           Electronically signed by: Ganesh Villegas M.D., 3/26/2024 12:59 PM       Eyeglasses

## 2024-09-30 NOTE — ED ADULT NURSE NOTE - NS ED NURSE RECORD ANOTHER HT AND WT
[facility]   Electrophysiology Consultation     Date: 10/3/2024  Reason for Consultation: ICD consideration    Consult Requesting Physician: Pop Lincoln MD      CC: Discuss ICD    HPI: Jerome Rajan is a 77 y.o. male history of CAD, hypertension, hyperlipidemia, struct of sleep apnea, isolated sending aorta, CAD status post anterior wall MI and PCI of LAD in 2000, ischemic cardiomyopathy with moderately reduced LVEF, 30 to 35% in 2018 that improved slightly to 35 to 40% by MUGA scan in 2019, cardiac MRI done 9/17/2024 with LVEF that remains low, 33%.  Patient was referred for ICD discussion.    Patient presents today as a new patient for the evaluation and management of ICD implantation.  Anterior wall MI and persistently low LVEF he remains active, has minimal to no exertional fatigue, dyspnea. Denies chest pain.  Denies palpitations, racing heart, dizziness or syncope.    Review of System:  [x] Full ROS obtained and negative except as mentioned in HPI or below    Prior to Admission medications    Medication Sig Start Date End Date Taking? Authorizing Provider   spironolactone (ALDACTONE) 25 MG tablet TAKE 1 TABLET BY MOUTH DAILY 6/6/24  Yes Pop Lincoln MD   rosuvastatin (CRESTOR) 40 MG tablet TAKE 1 TABLET BY MOUTH EVERY DAY 3/18/24  Yes Pop Lincoln MD   dapagliflozin (FARXIGA) 10 MG tablet TAKE 1 TABLET BY MOUTH EVERY MORNING 3/18/24  Yes Pop Lincoln MD   carvedilol (COREG) 25 MG tablet TAKE 1 TABLET BY MOUTH TWICE DAILY 1/17/24  Yes Pop Lincoln MD   sacubitril-valsartan (ENTRESTO)  MG per tablet Take 1 tablet by mouth 2 times daily 1/10/24  Yes Pop Lincoln MD   Multiple Vitamin (MULTIVITAMIN ADULT PO) Take by mouth   Yes Rachel Tolliver MD   Omega-3 Fatty Acids (FISH OIL OMEGA-3 PO) Take 1,200 mg by mouth daily   Yes Rachel Tolliver MD   aspirin 81 MG tablet Take 2 tablets by mouth nightly   Yes Rachel Tolliver MD   co-enzyme Q-10 30 MG 
Yes

## 2025-03-31 NOTE — ED ADULT NURSE NOTE - NEURO MENTATION
New this admission. Improved from 3/28 but still elevated. Patient developed RUQ pain on 3/30. He does have history of positive hep C antibodies and imaging findings of hepatic steatosis on previous admits. RUQUS similar to previous.   - daily CMP     normal